# Patient Record
Sex: MALE | Race: WHITE | NOT HISPANIC OR LATINO | Employment: FULL TIME | URBAN - METROPOLITAN AREA
[De-identification: names, ages, dates, MRNs, and addresses within clinical notes are randomized per-mention and may not be internally consistent; named-entity substitution may affect disease eponyms.]

---

## 2017-02-23 ENCOUNTER — NON-PROVIDER VISIT (OUTPATIENT)
Dept: URGENT CARE | Facility: PHYSICIAN GROUP | Age: 22
End: 2017-02-23

## 2017-02-23 DIAGNOSIS — Z02.1 PRE-EMPLOYMENT DRUG SCREENING: ICD-10-CM

## 2017-02-23 LAB
AMP AMPHETAMINE: NORMAL
COC COCAINE: NORMAL
INT CON NEG: NEGATIVE
INT CON POS: POSITIVE
MET METHAMPHETAMINES: NORMAL
OPI OPIATES: NORMAL
PCP PHENCYCLIDINE: NORMAL
POC DRUG COMMENT 753798-OCCUPATIONAL HEALTH: NORMAL
THC: NORMAL

## 2017-02-23 PROCEDURE — 80305 DRUG TEST PRSMV DIR OPT OBS: CPT | Performed by: EMERGENCY MEDICINE

## 2017-05-09 ENCOUNTER — NON-PROVIDER VISIT (OUTPATIENT)
Dept: URGENT CARE | Facility: PHYSICIAN GROUP | Age: 22
End: 2017-05-09

## 2017-05-09 DIAGNOSIS — Z02.1 PRE-EMPLOYMENT DRUG SCREENING: ICD-10-CM

## 2017-05-09 PROCEDURE — 80305 DRUG TEST PRSMV DIR OPT OBS: CPT | Performed by: FAMILY MEDICINE

## 2017-10-27 ENCOUNTER — NON-PROVIDER VISIT (OUTPATIENT)
Dept: URGENT CARE | Facility: PHYSICIAN GROUP | Age: 22
End: 2017-10-27

## 2017-10-27 DIAGNOSIS — Z02.1 PRE-EMPLOYMENT DRUG SCREENING: ICD-10-CM

## 2017-10-27 PROCEDURE — 80305 DRUG TEST PRSMV DIR OPT OBS: CPT | Performed by: PHYSICIAN ASSISTANT

## 2018-10-15 ENCOUNTER — IMMUNIZATION (OUTPATIENT)
Dept: SOCIAL WORK | Facility: CLINIC | Age: 23
End: 2018-10-15

## 2018-10-15 DIAGNOSIS — Z23 NEED FOR VACCINATION: ICD-10-CM

## 2018-10-15 PROCEDURE — 90686 IIV4 VACC NO PRSV 0.5 ML IM: CPT | Performed by: REGISTERED NURSE

## 2019-10-28 ENCOUNTER — IMMUNIZATION (OUTPATIENT)
Dept: SOCIAL WORK | Facility: CLINIC | Age: 24
End: 2019-10-28

## 2019-10-28 DIAGNOSIS — Z23 NEED FOR VACCINATION: ICD-10-CM

## 2019-10-28 PROCEDURE — 90686 IIV4 VACC NO PRSV 0.5 ML IM: CPT | Performed by: REGISTERED NURSE

## 2020-08-17 ENCOUNTER — NON-PROVIDER VISIT (OUTPATIENT)
Dept: URGENT CARE | Facility: PHYSICIAN GROUP | Age: 25
End: 2020-08-17

## 2020-08-17 DIAGNOSIS — Z02.1 PRE-EMPLOYMENT DRUG SCREENING: Primary | ICD-10-CM

## 2020-08-17 LAB
AMP AMPHETAMINE: NORMAL
COC COCAINE: NORMAL
INT CON NEG: NORMAL
INT CON POS: NORMAL
MET METHAMPHETAMINES: NORMAL
OPI OPIATES: NORMAL
PCP PHENCYCLIDINE: NORMAL
POC DRUG COMMENT 753798-OCCUPATIONAL HEALTH: NEGATIVE
THC: NORMAL

## 2020-08-17 PROCEDURE — 80305 DRUG TEST PRSMV DIR OPT OBS: CPT | Performed by: NURSE PRACTITIONER

## 2021-04-14 ENCOUNTER — HOSPITAL ENCOUNTER (EMERGENCY)
Dept: HOSPITAL 8 - ED | Age: 26
Discharge: HOME | End: 2021-04-14
Payer: SELF-PAY

## 2021-04-14 VITALS — SYSTOLIC BLOOD PRESSURE: 137 MMHG | DIASTOLIC BLOOD PRESSURE: 75 MMHG

## 2021-04-14 VITALS — HEIGHT: 74 IN | WEIGHT: 300.05 LBS | BODY MASS INDEX: 38.51 KG/M2
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
## 2021-04-14 DIAGNOSIS — S93.402A: Primary | ICD-10-CM

## 2021-04-14 DIAGNOSIS — E66.9: ICD-10-CM
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
## 2021-04-14 PROCEDURE — 96372 THER/PROPH/DIAG INJ SC/IM: CPT

## 2021-04-14 PROCEDURE — 99283 EMERGENCY DEPT VISIT LOW MDM: CPT

## 2021-04-14 PROCEDURE — 73610 X-RAY EXAM OF ANKLE: CPT

## 2024-09-23 ENCOUNTER — HOSPITAL ENCOUNTER (INPATIENT)
Facility: MEDICAL CENTER | Age: 29
LOS: 3 days | End: 2024-09-27
Attending: EMERGENCY MEDICINE | Admitting: HOSPITALIST
Payer: COMMERCIAL

## 2024-09-23 DIAGNOSIS — M10.061 ACUTE IDIOPATHIC GOUT OF RIGHT KNEE: ICD-10-CM

## 2024-09-23 DIAGNOSIS — M00.9 PYOGENIC ARTHRITIS OF RIGHT KNEE JOINT, DUE TO UNSPECIFIED ORGANISM (HCC): ICD-10-CM

## 2024-09-23 LAB
ANION GAP SERPL CALC-SCNC: 11 MMOL/L (ref 7–16)
APPEARANCE FLD: NORMAL
BASOPHILS # BLD AUTO: 0.4 % (ref 0–1.8)
BASOPHILS # BLD: 0.05 K/UL (ref 0–0.12)
BODY FLD TYPE: NORMAL
BUN SERPL-MCNC: 14 MG/DL (ref 8–22)
CALCIUM SERPL-MCNC: 9.2 MG/DL (ref 8.4–10.2)
CHLORIDE SERPL-SCNC: 105 MMOL/L (ref 96–112)
CO2 SERPL-SCNC: 23 MMOL/L (ref 20–33)
COLOR FLD: YELLOW
CREAT SERPL-MCNC: 0.99 MG/DL (ref 0.5–1.4)
CRYSTALS FLD MICRO: NORMAL
EOSINOPHIL # BLD AUTO: 0.11 K/UL (ref 0–0.51)
EOSINOPHIL NFR BLD: 0.8 % (ref 0–6.9)
ERYTHROCYTE [DISTWIDTH] IN BLOOD BY AUTOMATED COUNT: 39.1 FL (ref 35.9–50)
ERYTHROCYTE [SEDIMENTATION RATE] IN BLOOD BY WESTERGREN METHOD: 20 MM/HOUR (ref 0–20)
GFR SERPLBLD CREATININE-BSD FMLA CKD-EPI: 107 ML/MIN/1.73 M 2
GLUCOSE FLD-MCNC: 90 MG/DL
GLUCOSE SERPL-MCNC: 117 MG/DL (ref 65–99)
GRAM STN SPEC: NORMAL
HCT VFR BLD AUTO: 43.4 % (ref 42–52)
HGB BLD-MCNC: 14.6 G/DL (ref 14–18)
IMM GRANULOCYTES # BLD AUTO: 0.06 K/UL (ref 0–0.11)
IMM GRANULOCYTES NFR BLD AUTO: 0.4 % (ref 0–0.9)
LYMPHOCYTES # BLD AUTO: 2.23 K/UL (ref 1–4.8)
LYMPHOCYTES NFR BLD: 15.9 % (ref 22–41)
LYMPHOCYTES NFR FLD: 7 %
MCH RBC QN AUTO: 29.6 PG (ref 27–33)
MCHC RBC AUTO-ENTMCNC: 33.6 G/DL (ref 32.3–36.5)
MCV RBC AUTO: 87.9 FL (ref 81.4–97.8)
MONOCYTES # BLD AUTO: 1.43 K/UL (ref 0–0.85)
MONOCYTES NFR BLD AUTO: 10.2 % (ref 0–13.4)
MONOS+MACROS NFR FLD MANUAL: 8 %
NEUTROPHILS # BLD AUTO: 10.17 K/UL (ref 1.82–7.42)
NEUTROPHILS NFR BLD: 72.3 % (ref 44–72)
NEUTROPHILS NFR FLD: 85 %
NRBC # BLD AUTO: 0 K/UL
NRBC BLD-RTO: 0 /100 WBC (ref 0–0.2)
NUC CELL # FLD: NORMAL CELLS/UL
PLATELET # BLD AUTO: 295 K/UL (ref 164–446)
PMV BLD AUTO: 9.6 FL (ref 9–12.9)
POTASSIUM SERPL-SCNC: 3.9 MMOL/L (ref 3.6–5.5)
PROT FLD-MCNC: 4.8 G/DL
RBC # BLD AUTO: 4.94 M/UL (ref 4.7–6.1)
RBC # FLD: <2000 CELLS/UL
SIGNIFICANT IND 70042: NORMAL
SITE SITE: NORMAL
SODIUM SERPL-SCNC: 139 MMOL/L (ref 135–145)
SOURCE SOURCE: NORMAL
WBC # BLD AUTO: 14.1 K/UL (ref 4.8–10.8)

## 2024-09-23 PROCEDURE — 20610 DRAIN/INJ JOINT/BURSA W/O US: CPT

## 2024-09-23 PROCEDURE — 99291 CRITICAL CARE FIRST HOUR: CPT

## 2024-09-23 PROCEDURE — 700101 HCHG RX REV CODE 250: Performed by: EMERGENCY MEDICINE

## 2024-09-23 PROCEDURE — 82945 GLUCOSE OTHER FLUID: CPT

## 2024-09-23 PROCEDURE — 84157 ASSAY OF PROTEIN OTHER: CPT

## 2024-09-23 PROCEDURE — 0S9C3ZX DRAINAGE OF RIGHT KNEE JOINT, PERCUTANEOUS APPROACH, DIAGNOSTIC: ICD-10-PCS | Performed by: EMERGENCY MEDICINE

## 2024-09-23 PROCEDURE — 87116 MYCOBACTERIA CULTURE: CPT

## 2024-09-23 PROCEDURE — 87205 SMEAR GRAM STAIN: CPT

## 2024-09-23 PROCEDURE — 36415 COLL VENOUS BLD VENIPUNCTURE: CPT

## 2024-09-23 PROCEDURE — 85025 COMPLETE CBC W/AUTO DIFF WBC: CPT

## 2024-09-23 PROCEDURE — 87206 SMEAR FLUORESCENT/ACID STAI: CPT

## 2024-09-23 PROCEDURE — 80048 BASIC METABOLIC PNL TOTAL CA: CPT

## 2024-09-23 PROCEDURE — 89051 BODY FLUID CELL COUNT: CPT

## 2024-09-23 PROCEDURE — 85652 RBC SED RATE AUTOMATED: CPT

## 2024-09-23 PROCEDURE — 87070 CULTURE OTHR SPECIMN AEROBIC: CPT

## 2024-09-23 PROCEDURE — 89060 EXAM SYNOVIAL FLUID CRYSTALS: CPT

## 2024-09-23 PROCEDURE — 87999 UNLISTED MICROBIOLOGY PX: CPT

## 2024-09-23 PROCEDURE — 87015 SPECIMEN INFECT AGNT CONCNTJ: CPT

## 2024-09-23 RX ADMIN — LIDOCAINE HYDROCHLORIDE 10 ML: 10 INJECTION, SOLUTION INFILTRATION; PERINEURAL at 21:48

## 2024-09-23 ASSESSMENT — PAIN DESCRIPTION - PAIN TYPE: TYPE: ACUTE PAIN

## 2024-09-24 ENCOUNTER — ANESTHESIA (OUTPATIENT)
Dept: SURGERY | Facility: MEDICAL CENTER | Age: 29
End: 2024-09-24
Payer: COMMERCIAL

## 2024-09-24 ENCOUNTER — ANESTHESIA EVENT (OUTPATIENT)
Dept: SURGERY | Facility: MEDICAL CENTER | Age: 29
End: 2024-09-24
Payer: COMMERCIAL

## 2024-09-24 PROBLEM — R73.09 ELEVATED GLUCOSE: Status: ACTIVE | Noted: 2024-09-24

## 2024-09-24 PROBLEM — R03.0 ELEVATED BLOOD PRESSURE READING: Status: ACTIVE | Noted: 2024-09-24

## 2024-09-24 PROBLEM — Z01.810 PREOPERATIVE CARDIOVASCULAR EXAMINATION: Status: ACTIVE | Noted: 2024-09-24

## 2024-09-24 PROBLEM — M00.9 SEPTIC JOINT (HCC): Status: ACTIVE | Noted: 2024-09-24

## 2024-09-24 LAB
FUNGUS SPEC CULT: NORMAL
FUNGUS SPEC CULT: NORMAL
FUNGUS SPEC FUNGUS STN: NORMAL
GRAM STN SPEC: NORMAL
GRAM STN SPEC: NORMAL
LACTATE SERPL-SCNC: 0.8 MMOL/L (ref 0.5–2)
SCCMEC + MECA PNL NOSE NAA+PROBE: NEGATIVE
SIGNIFICANT IND 70042: NORMAL
SITE SITE: NORMAL
SOURCE SOURCE: NORMAL

## 2024-09-24 PROCEDURE — 700101 HCHG RX REV CODE 250: Performed by: HOSPITALIST

## 2024-09-24 PROCEDURE — 87801 DETECT AGNT MULT DNA AMPLI: CPT

## 2024-09-24 PROCEDURE — 36415 COLL VENOUS BLD VENIPUNCTURE: CPT

## 2024-09-24 PROCEDURE — 87205 SMEAR GRAM STAIN: CPT | Mod: 91

## 2024-09-24 PROCEDURE — 0SBC4ZZ EXCISION OF RIGHT KNEE JOINT, PERCUTANEOUS ENDOSCOPIC APPROACH: ICD-10-PCS | Performed by: STUDENT IN AN ORGANIZED HEALTH CARE EDUCATION/TRAINING PROGRAM

## 2024-09-24 PROCEDURE — 87556 M.TUBERCULO DNA AMP PROBE: CPT

## 2024-09-24 PROCEDURE — 700111 HCHG RX REV CODE 636 W/ 250 OVERRIDE (IP): Mod: JZ | Performed by: ANESTHESIOLOGY

## 2024-09-24 PROCEDURE — 700105 HCHG RX REV CODE 258: Performed by: ANESTHESIOLOGY

## 2024-09-24 PROCEDURE — 96365 THER/PROPH/DIAG IV INF INIT: CPT

## 2024-09-24 PROCEDURE — 700101 HCHG RX REV CODE 250: Performed by: STUDENT IN AN ORGANIZED HEALTH CARE EDUCATION/TRAINING PROGRAM

## 2024-09-24 PROCEDURE — 160048 HCHG OR STATISTICAL LEVEL 1-5: Performed by: STUDENT IN AN ORGANIZED HEALTH CARE EDUCATION/TRAINING PROGRAM

## 2024-09-24 PROCEDURE — 87641 MR-STAPH DNA AMP PROBE: CPT

## 2024-09-24 PROCEDURE — 160035 HCHG PACU - 1ST 60 MINS PHASE I: Performed by: STUDENT IN AN ORGANIZED HEALTH CARE EDUCATION/TRAINING PROGRAM

## 2024-09-24 PROCEDURE — 3E1U48Z IRRIGATION OF JOINTS USING IRRIGATING SUBSTANCE, PERCUTANEOUS ENDOSCOPIC APPROACH: ICD-10-PCS | Performed by: STUDENT IN AN ORGANIZED HEALTH CARE EDUCATION/TRAINING PROGRAM

## 2024-09-24 PROCEDURE — 160002 HCHG RECOVERY MINUTES (STAT): Performed by: STUDENT IN AN ORGANIZED HEALTH CARE EDUCATION/TRAINING PROGRAM

## 2024-09-24 PROCEDURE — 160009 HCHG ANES TIME/MIN: Performed by: STUDENT IN AN ORGANIZED HEALTH CARE EDUCATION/TRAINING PROGRAM

## 2024-09-24 PROCEDURE — 87551 MYCOBACTERIA DNA AMP PROBE: CPT

## 2024-09-24 PROCEDURE — 700105 HCHG RX REV CODE 258: Performed by: HOSPITALIST

## 2024-09-24 PROCEDURE — 99223 1ST HOSP IP/OBS HIGH 75: CPT | Performed by: HOSPITALIST

## 2024-09-24 PROCEDURE — 160038 HCHG SURGERY MINUTES - EA ADDL 1 MIN LEVEL 2: Performed by: STUDENT IN AN ORGANIZED HEALTH CARE EDUCATION/TRAINING PROGRAM

## 2024-09-24 PROCEDURE — 96366 THER/PROPH/DIAG IV INF ADDON: CPT

## 2024-09-24 PROCEDURE — 87075 CULTR BACTERIA EXCEPT BLOOD: CPT

## 2024-09-24 PROCEDURE — 87102 FUNGUS ISOLATION CULTURE: CPT | Mod: 91

## 2024-09-24 PROCEDURE — 700101 HCHG RX REV CODE 250: Performed by: ANESTHESIOLOGY

## 2024-09-24 PROCEDURE — 83605 ASSAY OF LACTIC ACID: CPT

## 2024-09-24 PROCEDURE — 94760 N-INVAS EAR/PLS OXIMETRY 1: CPT

## 2024-09-24 PROCEDURE — 770001 HCHG ROOM/CARE - MED/SURG/GYN PRIV*

## 2024-09-24 PROCEDURE — 160027 HCHG SURGERY MINUTES - 1ST 30 MINS LEVEL 2: Performed by: STUDENT IN AN ORGANIZED HEALTH CARE EDUCATION/TRAINING PROGRAM

## 2024-09-24 PROCEDURE — 700105 HCHG RX REV CODE 258: Performed by: EMERGENCY MEDICINE

## 2024-09-24 PROCEDURE — 700111 HCHG RX REV CODE 636 W/ 250 OVERRIDE (IP): Performed by: HOSPITALIST

## 2024-09-24 PROCEDURE — 87070 CULTURE OTHR SPECIMN AEROBIC: CPT | Mod: 91

## 2024-09-24 PROCEDURE — 700101 HCHG RX REV CODE 250: Performed by: EMERGENCY MEDICINE

## 2024-09-24 PROCEDURE — 700111 HCHG RX REV CODE 636 W/ 250 OVERRIDE (IP): Performed by: EMERGENCY MEDICINE

## 2024-09-24 RX ORDER — DIPHENHYDRAMINE HYDROCHLORIDE 50 MG/ML
12.5 INJECTION INTRAMUSCULAR; INTRAVENOUS
Status: DISCONTINUED | OUTPATIENT
Start: 2024-09-24 | End: 2024-09-24 | Stop reason: HOSPADM

## 2024-09-24 RX ORDER — KETOROLAC TROMETHAMINE 15 MG/ML
INJECTION, SOLUTION INTRAMUSCULAR; INTRAVENOUS PRN
Status: DISCONTINUED | OUTPATIENT
Start: 2024-09-24 | End: 2024-09-24 | Stop reason: SURG

## 2024-09-24 RX ORDER — PROMETHAZINE HYDROCHLORIDE 25 MG/1
12.5-25 SUPPOSITORY RECTAL EVERY 4 HOURS PRN
Status: DISCONTINUED | OUTPATIENT
Start: 2024-09-24 | End: 2024-09-27 | Stop reason: HOSPADM

## 2024-09-24 RX ORDER — HYDROMORPHONE HYDROCHLORIDE 1 MG/ML
0.4 INJECTION, SOLUTION INTRAMUSCULAR; INTRAVENOUS; SUBCUTANEOUS
Status: DISCONTINUED | OUTPATIENT
Start: 2024-09-24 | End: 2024-09-24 | Stop reason: HOSPADM

## 2024-09-24 RX ORDER — ACETAMINOPHEN 325 MG/1
650 TABLET ORAL EVERY 4 HOURS PRN
Status: DISCONTINUED | OUTPATIENT
Start: 2024-09-24 | End: 2024-09-24 | Stop reason: HOSPADM

## 2024-09-24 RX ORDER — ONDANSETRON 2 MG/ML
INJECTION INTRAMUSCULAR; INTRAVENOUS PRN
Status: DISCONTINUED | OUTPATIENT
Start: 2024-09-24 | End: 2024-09-24 | Stop reason: SURG

## 2024-09-24 RX ORDER — ONDANSETRON 4 MG/1
4 TABLET, ORALLY DISINTEGRATING ORAL EVERY 4 HOURS PRN
Status: DISCONTINUED | OUTPATIENT
Start: 2024-09-24 | End: 2024-09-27 | Stop reason: HOSPADM

## 2024-09-24 RX ORDER — PROCHLORPERAZINE EDISYLATE 5 MG/ML
5-10 INJECTION INTRAMUSCULAR; INTRAVENOUS EVERY 4 HOURS PRN
Status: DISCONTINUED | OUTPATIENT
Start: 2024-09-24 | End: 2024-09-27 | Stop reason: HOSPADM

## 2024-09-24 RX ORDER — HYDROMORPHONE HYDROCHLORIDE 1 MG/ML
0.25 INJECTION, SOLUTION INTRAMUSCULAR; INTRAVENOUS; SUBCUTANEOUS
Status: DISCONTINUED | OUTPATIENT
Start: 2024-09-24 | End: 2024-09-25

## 2024-09-24 RX ORDER — VANCOMYCIN HYDROCHLORIDE 1 G/20ML
INJECTION, POWDER, LYOPHILIZED, FOR SOLUTION INTRAVENOUS PRN
Status: DISCONTINUED | OUTPATIENT
Start: 2024-09-24 | End: 2024-09-24 | Stop reason: SURG

## 2024-09-24 RX ORDER — AMOXICILLIN 250 MG
2 CAPSULE ORAL 2 TIMES DAILY
Status: DISCONTINUED | OUTPATIENT
Start: 2024-09-24 | End: 2024-09-27 | Stop reason: HOSPADM

## 2024-09-24 RX ORDER — MIDAZOLAM HYDROCHLORIDE 1 MG/ML
INJECTION INTRAMUSCULAR; INTRAVENOUS PRN
Status: DISCONTINUED | OUTPATIENT
Start: 2024-09-24 | End: 2024-09-24 | Stop reason: SURG

## 2024-09-24 RX ORDER — HYDROMORPHONE HYDROCHLORIDE 1 MG/ML
0.5 INJECTION, SOLUTION INTRAMUSCULAR; INTRAVENOUS; SUBCUTANEOUS
Status: DISCONTINUED | OUTPATIENT
Start: 2024-09-24 | End: 2024-09-25

## 2024-09-24 RX ORDER — HYDROMORPHONE HYDROCHLORIDE 1 MG/ML
0.2 INJECTION, SOLUTION INTRAMUSCULAR; INTRAVENOUS; SUBCUTANEOUS
Status: DISCONTINUED | OUTPATIENT
Start: 2024-09-24 | End: 2024-09-24 | Stop reason: HOSPADM

## 2024-09-24 RX ORDER — HYDROMORPHONE HYDROCHLORIDE 1 MG/ML
0.1 INJECTION, SOLUTION INTRAMUSCULAR; INTRAVENOUS; SUBCUTANEOUS
Status: DISCONTINUED | OUTPATIENT
Start: 2024-09-24 | End: 2024-09-24 | Stop reason: HOSPADM

## 2024-09-24 RX ORDER — HALOPERIDOL 5 MG/ML
1 INJECTION INTRAMUSCULAR
Status: DISCONTINUED | OUTPATIENT
Start: 2024-09-24 | End: 2024-09-24 | Stop reason: HOSPADM

## 2024-09-24 RX ORDER — SODIUM CHLORIDE, SODIUM LACTATE, POTASSIUM CHLORIDE, AND CALCIUM CHLORIDE .6; .31; .03; .02 G/100ML; G/100ML; G/100ML; G/100ML
500 INJECTION, SOLUTION INTRAVENOUS
Status: DISCONTINUED | OUTPATIENT
Start: 2024-09-24 | End: 2024-09-27 | Stop reason: HOSPADM

## 2024-09-24 RX ORDER — MEPERIDINE HYDROCHLORIDE 25 MG/ML
12.5 INJECTION INTRAMUSCULAR; INTRAVENOUS; SUBCUTANEOUS
Status: DISCONTINUED | OUTPATIENT
Start: 2024-09-24 | End: 2024-09-24 | Stop reason: HOSPADM

## 2024-09-24 RX ORDER — ACETAMINOPHEN 325 MG/1
650 TABLET ORAL EVERY 6 HOURS PRN
Status: DISCONTINUED | OUTPATIENT
Start: 2024-09-24 | End: 2024-09-27 | Stop reason: HOSPADM

## 2024-09-24 RX ORDER — ONDANSETRON 2 MG/ML
4 INJECTION INTRAMUSCULAR; INTRAVENOUS EVERY 4 HOURS PRN
Status: DISCONTINUED | OUTPATIENT
Start: 2024-09-24 | End: 2024-09-27 | Stop reason: HOSPADM

## 2024-09-24 RX ORDER — BUPIVACAINE HYDROCHLORIDE AND EPINEPHRINE 2.5; 5 MG/ML; UG/ML
INJECTION, SOLUTION EPIDURAL; INFILTRATION; INTRACAUDAL; PERINEURAL
Status: DISCONTINUED | OUTPATIENT
Start: 2024-09-24 | End: 2024-09-24 | Stop reason: HOSPADM

## 2024-09-24 RX ORDER — OXYCODONE HCL 5 MG/5 ML
5 SOLUTION, ORAL ORAL
Status: DISCONTINUED | OUTPATIENT
Start: 2024-09-24 | End: 2024-09-24 | Stop reason: HOSPADM

## 2024-09-24 RX ORDER — PROMETHAZINE HYDROCHLORIDE 25 MG/1
12.5-25 TABLET ORAL EVERY 4 HOURS PRN
Status: DISCONTINUED | OUTPATIENT
Start: 2024-09-24 | End: 2024-09-27 | Stop reason: HOSPADM

## 2024-09-24 RX ORDER — ONDANSETRON 2 MG/ML
4 INJECTION INTRAMUSCULAR; INTRAVENOUS
Status: DISCONTINUED | OUTPATIENT
Start: 2024-09-24 | End: 2024-09-24 | Stop reason: HOSPADM

## 2024-09-24 RX ORDER — DIPHENHYDRAMINE HYDROCHLORIDE 50 MG/ML
INJECTION INTRAMUSCULAR; INTRAVENOUS PRN
Status: DISCONTINUED | OUTPATIENT
Start: 2024-09-24 | End: 2024-09-24 | Stop reason: SURG

## 2024-09-24 RX ORDER — MIDAZOLAM HYDROCHLORIDE 1 MG/ML
1 INJECTION INTRAMUSCULAR; INTRAVENOUS
Status: DISCONTINUED | OUTPATIENT
Start: 2024-09-24 | End: 2024-09-24 | Stop reason: HOSPADM

## 2024-09-24 RX ORDER — POLYETHYLENE GLYCOL 3350 17 G/17G
1 POWDER, FOR SOLUTION ORAL
Status: DISCONTINUED | OUTPATIENT
Start: 2024-09-24 | End: 2024-09-27 | Stop reason: HOSPADM

## 2024-09-24 RX ORDER — MAGNESIUM HYDROXIDE 1200 MG/15ML
LIQUID ORAL
Status: COMPLETED | OUTPATIENT
Start: 2024-09-24 | End: 2024-09-24

## 2024-09-24 RX ORDER — EPHEDRINE SULFATE 50 MG/ML
5 INJECTION, SOLUTION INTRAVENOUS
Status: DISCONTINUED | OUTPATIENT
Start: 2024-09-24 | End: 2024-09-24 | Stop reason: HOSPADM

## 2024-09-24 RX ORDER — METOPROLOL TARTRATE 1 MG/ML
1 INJECTION, SOLUTION INTRAVENOUS
Status: DISCONTINUED | OUTPATIENT
Start: 2024-09-24 | End: 2024-09-24 | Stop reason: HOSPADM

## 2024-09-24 RX ORDER — LABETALOL HYDROCHLORIDE 5 MG/ML
5 INJECTION, SOLUTION INTRAVENOUS
Status: DISCONTINUED | OUTPATIENT
Start: 2024-09-24 | End: 2024-09-24 | Stop reason: HOSPADM

## 2024-09-24 RX ORDER — TRANEXAMIC ACID 100 MG/ML
INJECTION, SOLUTION INTRAVENOUS PRN
Status: DISCONTINUED | OUTPATIENT
Start: 2024-09-24 | End: 2024-09-24 | Stop reason: SURG

## 2024-09-24 RX ORDER — ALBUTEROL SULFATE 5 MG/ML
2.5 SOLUTION RESPIRATORY (INHALATION)
Status: DISCONTINUED | OUTPATIENT
Start: 2024-09-24 | End: 2024-09-24 | Stop reason: HOSPADM

## 2024-09-24 RX ORDER — OXYCODONE HCL 5 MG/5 ML
10 SOLUTION, ORAL ORAL
Status: DISCONTINUED | OUTPATIENT
Start: 2024-09-24 | End: 2024-09-24 | Stop reason: HOSPADM

## 2024-09-24 RX ORDER — LIDOCAINE HYDROCHLORIDE 20 MG/ML
INJECTION, SOLUTION EPIDURAL; INFILTRATION; INTRACAUDAL; PERINEURAL PRN
Status: DISCONTINUED | OUTPATIENT
Start: 2024-09-24 | End: 2024-09-24 | Stop reason: SURG

## 2024-09-24 RX ORDER — SODIUM CHLORIDE, SODIUM LACTATE, POTASSIUM CHLORIDE, CALCIUM CHLORIDE 600; 310; 30; 20 MG/100ML; MG/100ML; MG/100ML; MG/100ML
INJECTION, SOLUTION INTRAVENOUS
Status: DISCONTINUED | OUTPATIENT
Start: 2024-09-24 | End: 2024-09-24 | Stop reason: SURG

## 2024-09-24 RX ADMIN — VANCOMYCIN HYDROCHLORIDE 3000 MG: 5 INJECTION, POWDER, LYOPHILIZED, FOR SOLUTION INTRAVENOUS at 02:02

## 2024-09-24 RX ADMIN — PROPOFOL 200 MG: 10 INJECTION, EMULSION INTRAVENOUS at 07:13

## 2024-09-24 RX ADMIN — DIPHENHYDRAMINE HYDROCHLORIDE 10 MG: 50 INJECTION, SOLUTION INTRAMUSCULAR; INTRAVENOUS at 07:38

## 2024-09-24 RX ADMIN — VANCOMYCIN HYDROCHLORIDE 1500 MG: 5 INJECTION, POWDER, LYOPHILIZED, FOR SOLUTION INTRAVENOUS at 12:14

## 2024-09-24 RX ADMIN — KETOROLAC TROMETHAMINE 15 MG: 15 INJECTION, SOLUTION INTRAMUSCULAR; INTRAVENOUS at 07:38

## 2024-09-24 RX ADMIN — MIDAZOLAM HYDROCHLORIDE 2 MG: 2 INJECTION, SOLUTION INTRAMUSCULAR; INTRAVENOUS at 07:11

## 2024-09-24 RX ADMIN — VANCOMYCIN HYDROCHLORIDE 2 G: 1 INJECTION, POWDER, LYOPHILIZED, FOR SOLUTION INTRAVENOUS at 07:30

## 2024-09-24 RX ADMIN — SODIUM CHLORIDE, POTASSIUM CHLORIDE, SODIUM LACTATE AND CALCIUM CHLORIDE: 600; 310; 30; 20 INJECTION, SOLUTION INTRAVENOUS at 07:10

## 2024-09-24 RX ADMIN — TRANEXAMIC ACID 1000 MG: 100 INJECTION, SOLUTION INTRAVENOUS at 07:34

## 2024-09-24 RX ADMIN — FENTANYL CITRATE 100 MCG: 50 INJECTION, SOLUTION INTRAMUSCULAR; INTRAVENOUS at 07:17

## 2024-09-24 RX ADMIN — ONDANSETRON 4 MG: 2 INJECTION INTRAMUSCULAR; INTRAVENOUS at 07:38

## 2024-09-24 RX ADMIN — VANCOMYCIN HYDROCHLORIDE 1500 MG: 5 INJECTION, POWDER, LYOPHILIZED, FOR SOLUTION INTRAVENOUS at 17:28

## 2024-09-24 RX ADMIN — LIDOCAINE HYDROCHLORIDE 100 MG: 20 INJECTION, SOLUTION EPIDURAL; INFILTRATION; INTRACAUDAL at 07:13

## 2024-09-24 SDOH — ECONOMIC STABILITY: TRANSPORTATION INSECURITY
IN THE PAST 12 MONTHS, HAS THE LACK OF TRANSPORTATION KEPT YOU FROM MEDICAL APPOINTMENTS OR FROM GETTING MEDICATIONS?: NO

## 2024-09-24 SDOH — ECONOMIC STABILITY: TRANSPORTATION INSECURITY
IN THE PAST 12 MONTHS, HAS LACK OF RELIABLE TRANSPORTATION KEPT YOU FROM MEDICAL APPOINTMENTS, MEETINGS, WORK OR FROM GETTING THINGS NEEDED FOR DAILY LIVING?: NO

## 2024-09-24 ASSESSMENT — LIFESTYLE VARIABLES
ALCOHOL_USE: NO
EVER FELT BAD OR GUILTY ABOUT YOUR DRINKING: NO
CONSUMPTION TOTAL: NEGATIVE
EVER HAD A DRINK FIRST THING IN THE MORNING TO STEADY YOUR NERVES TO GET RID OF A HANGOVER: NO
HAVE PEOPLE ANNOYED YOU BY CRITICIZING YOUR DRINKING: NO
AVERAGE NUMBER OF DAYS PER WEEK YOU HAVE A DRINK CONTAINING ALCOHOL: 0
TOTAL SCORE: 0
ON A TYPICAL DAY WHEN YOU DRINK ALCOHOL HOW MANY DRINKS DO YOU HAVE: 0
TOTAL SCORE: 0
TOTAL SCORE: 0
HOW MANY TIMES IN THE PAST YEAR HAVE YOU HAD 5 OR MORE DRINKS IN A DAY: 0
HAVE YOU EVER FELT YOU SHOULD CUT DOWN ON YOUR DRINKING: NO

## 2024-09-24 ASSESSMENT — COGNITIVE AND FUNCTIONAL STATUS - GENERAL
SUGGESTED CMS G CODE MODIFIER DAILY ACTIVITY: CH
SUGGESTED CMS G CODE MODIFIER MOBILITY: CH
MOBILITY SCORE: 24
DAILY ACTIVITIY SCORE: 24

## 2024-09-24 ASSESSMENT — ENCOUNTER SYMPTOMS
NAUSEA: 0
SHORTNESS OF BREATH: 0
BRUISES/BLEEDS EASILY: 0
BLURRED VISION: 0
DEPRESSION: 0
MYALGIAS: 0
PALPITATIONS: 0
HEADACHES: 0
DIZZINESS: 0
CHILLS: 1
VOMITING: 0
SORE THROAT: 0
NECK PAIN: 0
COUGH: 0
DOUBLE VISION: 0
FEVER: 1
WEAKNESS: 0
INSOMNIA: 0

## 2024-09-24 ASSESSMENT — PAIN DESCRIPTION - PAIN TYPE
TYPE: SURGICAL PAIN

## 2024-09-24 ASSESSMENT — PAIN SCALES - GENERAL: PAIN_LEVEL: 0

## 2024-09-24 ASSESSMENT — SOCIAL DETERMINANTS OF HEALTH (SDOH)
WITHIN THE LAST YEAR, HAVE YOU BEEN HUMILIATED OR EMOTIONALLY ABUSED IN OTHER WAYS BY YOUR PARTNER OR EX-PARTNER?: NO
IN THE PAST 12 MONTHS, HAS THE ELECTRIC, GAS, OIL, OR WATER COMPANY THREATENED TO SHUT OFF SERVICE IN YOUR HOME?: NO
WITHIN THE LAST YEAR, HAVE YOU BEEN AFRAID OF YOUR PARTNER OR EX-PARTNER?: NO
WITHIN THE PAST 12 MONTHS, YOU WORRIED THAT YOUR FOOD WOULD RUN OUT BEFORE YOU GOT THE MONEY TO BUY MORE: NEVER TRUE
WITHIN THE PAST 12 MONTHS, THE FOOD YOU BOUGHT JUST DIDN'T LAST AND YOU DIDN'T HAVE MONEY TO GET MORE: NEVER TRUE
WITHIN THE LAST YEAR, HAVE TO BEEN RAPED OR FORCED TO HAVE ANY KIND OF SEXUAL ACTIVITY BY YOUR PARTNER OR EX-PARTNER?: NO
WITHIN THE LAST YEAR, HAVE YOU BEEN KICKED, HIT, SLAPPED, OR OTHERWISE PHYSICALLY HURT BY YOUR PARTNER OR EX-PARTNER?: NO

## 2024-09-24 ASSESSMENT — PATIENT HEALTH QUESTIONNAIRE - PHQ9
2. FEELING DOWN, DEPRESSED, IRRITABLE, OR HOPELESS: NOT AT ALL
1. LITTLE INTEREST OR PLEASURE IN DOING THINGS: NOT AT ALL
SUM OF ALL RESPONSES TO PHQ9 QUESTIONS 1 AND 2: 0

## 2024-09-24 NOTE — ED TRIAGE NOTES
Chief Complaint   Patient presents with    Knee Pain     R knee since this AM with T max 101. Denies known injury. New tattoo in area approx 10d old      Physical Exam  Pulmonary:      Effort: Pulmonary effort is normal.   Skin:     General: Skin is warm and dry.   Neurological:      Mental Status: He is alert.

## 2024-09-24 NOTE — ANESTHESIA TIME REPORT
Anesthesia Start and Stop Event Times       Date Time Event    9/24/2024 0710 Ready for Procedure     0710 Anesthesia Start     0756 Anesthesia Stop          Responsible Staff  09/24/24      Name Role Begin End    Nitin Nolasco M.D. Anesth 0710 0756          Overtime Reason:  no overtime (within assigned shift)    Comments:

## 2024-09-24 NOTE — ASSESSMENT & PLAN NOTE
-His glucose is 117 on admission.    9/25: WNL today, no need for further management at this time.

## 2024-09-24 NOTE — OR NURSING
0806 assumed pt care, report received from PETER Varma.    0814 pt sleeping, no s/s of pain or distress observed; VSS.Ice pack applied over clean, dry, intact right knee surgical dressing.     0824 pt sleeping, VSS. Pt's significant other updated w/ plan of care via telephone.     0839 pt rouses to voice, denies any pain or discomfort.     0854 pt given sips of water, tolerating well. Plan of care reviewed w/ pt. Awaiting for bed assignment.    0909 pt sleeping, no change in pt's status.     0924 pt sleeping, VSS.     0954 pt sleeping, VSS. Right knee surgical dressing remains CDI.     1015 pt sleeping, no change in pt's status.    1045 VSS, pt sleeping.    1115 pt denies any pain or discomfort, VSS.     1140 bed assignment received, report given to receiving RN.     1143 pt to room 202 via hospital bed with RN.

## 2024-09-24 NOTE — ASSESSMENT & PLAN NOTE
-Inpatient status on medical floor.  -Patient with recent tattoo done to his leg 10 days ago, coming with atraumatic worsening knee pain and erythema.  Knee was tapped in the emergency department and fluid concerning for septic arthritis.  -He also has fever, tachycardia and leukocytosis therefore positive for SIRS but there is no endorgan dysfunction and not septic on admission per sepsis 3 criteria.  -Patient was a started on vancomycin IV which we will continue.  -IV pain control with narcotics also ordered.  -He will be n.p.o. for upcoming procedure in the morning.      9/25: Patient underwent knee I/D by ortho yesterday. We have consulted ID. Currently on vancomycin.    9/26: Patient most likely with gout arthritis, unclear if the patient also has superimposed bacterial infection.  However ID for now we will stop antibiotics.  The patient did have an elevated white blood cell count, will repeat CBC tomorrow.

## 2024-09-24 NOTE — ANESTHESIA PREPROCEDURE EVALUATION
Case: [id number] Date/Time: 09/24/24 0645    Procedure: IRRIGATION AND DEBRIDEMENT, WOUND, ORTHO (Right: Knee)    Anesthesia type: General    Pre-op diagnosis: Right septic knee arthritis    Location:  OR  / SURGERY [facility]    Surgeons: Julito Gomez M.D.            Relevant Problems   Other   (positive) Septic joint (HCC)       Physical Exam    Airway   Mallampati: II  TM distance: >3 FB  Neck ROM: full       Cardiovascular - normal exam  Rhythm: regular  Rate: normal  (-) murmur     Dental - normal exam           Pulmonary - normal exam  Breath sounds clear to auscultation     Abdominal    Neurological - normal exam                   Anesthesia Plan    ASA 2- EMERGENT   ASA physical status emergent criteria: acute deteriorating condition due to infection    Plan - general       Airway plan will be LMA          Induction: intravenous    Postoperative Plan: Postoperative administration of opioids is intended.    Pertinent diagnostic labs and testing reviewed    Informed Consent:    Anesthetic plan and risks discussed with patient.    Use of blood products discussed with: patient whom consented to blood products.

## 2024-09-24 NOTE — PROGRESS NOTES
Patient admitted earlier by Dr. Blake.  For further details please review her H&P.    Patient to undergo surgery today by orthopedic surgery.  Continue antibiotics.  We appreciate further conditions.  Continue to monitor closely.

## 2024-09-24 NOTE — H&P
Hospital Medicine History & Physical Note    Date of Service  9/24/2024    Primary Care Physician  Pcp Pt States None    Consultants  orthopedics    Specialist Names: ERP discussed this case with Dr. Gomez    Code Status  Full Code    Chief Complaint  Chief Complaint   Patient presents with    Knee Pain     R knee since this AM with T max 101. Denies known injury. New tattoo in area approx 10d old        History of Presenting Illness  Olga Peace is a 29 y.o. male who presented to the ER on 9/23/2024 with complains of severe right knee pain, atraumatic and fevers of 101.  He also reports having a new tattoo around the area to his thigh about 10 days ago.  Patient is tachycardic  And has elevated WBC therefore has SIRS.  Knee tap fluid showed positive for septic arthritis.  ERP discussed with orthopedic surgeon, Dr. Gomez and patient will need to be admitted for washout in the morning therefore I was called for admission.      I discussed the plan of care with patient and ERP Dr. Olivares .    Review of Systems  Review of Systems   Constitutional:  Positive for chills, fever and malaise/fatigue.   HENT:  Negative for congestion and sore throat.    Eyes:  Negative for blurred vision and double vision.   Respiratory:  Negative for cough and shortness of breath.    Cardiovascular:  Negative for chest pain and palpitations.   Gastrointestinal:  Negative for nausea and vomiting.   Genitourinary:  Negative for dysuria and urgency.   Musculoskeletal:  Positive for joint pain. Negative for myalgias and neck pain.   Skin:  Negative for itching and rash.   Neurological:  Negative for dizziness, weakness and headaches.   Endo/Heme/Allergies:  Does not bruise/bleed easily.   Psychiatric/Behavioral:  Negative for depression. The patient does not have insomnia.        Past Medical History   has a past medical history of Patient denies medical problems.    Surgical History  Recent Tattoo to the leg    Family  History  Reviewed and not pertinent  Family history reviewed with patient. There is no family history that is pertinent to the chief complaint.     Social History   reports that he has never smoked. He does not have any smokeless tobacco history on file. He reports that he does not drink alcohol and does not use drugs.    Allergies  No Known Allergies    Medications  Prior to Admission Medications   Prescriptions Last Dose Informant Patient Reported? Taking?   acetaminophen (TYLENOL) 325 MG Tab   Yes No   Sig: Take 650 mg by mouth every four hours as needed.   albuterol (VENTOLIN OR PROVENTIL) 108 (90 BASE) MCG/ACT Aero Soln inhalation aerosol   No No   Sig: Inhale 2 Puffs by mouth every 6 hours as needed for Shortness of Breath. With spacer   guaifenesin-codeine (ROBITUSSIN AC) SOLN   No No   Sig: Take 10 mL by mouth every 6 hours as needed for Cough.   ibuprofen (MOTRIN) 100 MG/5ML SUSP   No No   Sig: Take 40 mL by mouth every 8 hours as needed.   loratadine (CLARITIN) 10 MG Tab   No No   Sig: Take 1 Tab by mouth every day.      Facility-Administered Medications: None       Physical Exam  Temp:  [37.5 °C (99.5 °F)] 37.5 °C (99.5 °F)  Pulse:  [] 101  Resp:  [16-18] 18  BP: (138-163)/(61-91) 141/62  SpO2:  [91 %-94 %] 92 %  Blood Pressure: (!) 141/62   Temperature: 37.5 °C (99.5 °F)   Pulse: (!) 101   Respiration: 18   Pulse Oximetry: 92 %       Physical Exam  Constitutional:       Appearance: Normal appearance.   HENT:      Head: Normocephalic and atraumatic.      Nose: Nose normal.      Mouth/Throat:      Mouth: Mucous membranes are moist.      Pharynx: Oropharynx is clear.   Eyes:      Extraocular Movements: Extraocular movements intact.      Pupils: Pupils are equal, round, and reactive to light.   Cardiovascular:      Rate and Rhythm: Normal rate and regular rhythm.      Pulses: Normal pulses.      Heart sounds: Normal heart sounds.   Pulmonary:      Effort: Pulmonary effort is normal.      Breath sounds:  Normal breath sounds.   Abdominal:      General: Abdomen is flat. Bowel sounds are normal.      Palpations: Abdomen is soft.   Musculoskeletal:      Cervical back: Normal range of motion and neck supple.      Comments: Right knee edema and erythema. Pain with ROM   Skin:     General: Skin is warm and dry.   Neurological:      General: No focal deficit present.      Mental Status: He is alert and oriented to person, place, and time.   Psychiatric:         Mood and Affect: Mood normal.         Behavior: Behavior normal.         Laboratory:  Recent Labs     09/23/24  2208   WBC 14.1*   RBC 4.94   HEMOGLOBIN 14.6   HEMATOCRIT 43.4   MCV 87.9   MCH 29.6   MCHC 33.6   RDW 39.1   PLATELETCT 295   MPV 9.6     Recent Labs     09/23/24  2208   SODIUM 139   POTASSIUM 3.9   CHLORIDE 105   CO2 23   GLUCOSE 117*   BUN 14   CREATININE 0.99   CALCIUM 9.2     Recent Labs     09/23/24  2208   GLUCOSE 117*             no X-Ray or EKG requiring interpretation    Assessment/Plan:  Justification for Admission Status  I anticipate this patient will require at least two midnights for appropriate medical management, necessitating inpatient admission because 29-year-old male, coming with septic joint      * Septic joint (HCC)- (present on admission)  Assessment & Plan  -Inpatient status on medical floor.  -Patient with recent tattoo done to his leg 10 days ago, coming with atraumatic worsening knee pain and erythema.  Knee was tapped in the emergency department and fluid concerning for septic arthritis.  -He also has fever, tachycardia and leukocytosis therefore positive for SIRS but there is no endorgan dysfunction and not septic on admission per sepsis 3 criteria.  -Patient was a started on vancomycin IV which we will continue.  -IV pain control with narcotics also ordered.  -He will be n.p.o. for upcoming procedure in the morning.  -ERP discussed this case with Dr. Gomez, orthopedic surgeon.  Appreciate consult  recommendations.    Preoperative cardiovascular examination  Assessment & Plan  -Cardiovascular risk is mild and procedure risk is mild.  Patient is medically optimized for surgical intervention without additional workup.    Elevated blood pressure reading  Assessment & Plan  -Blood pressure is 141/62.  This is likely secondary to pain.  Focus on pain control.  He does not have history hypertension.  Closely monitor blood pressure overnight.    Elevated glucose  Assessment & Plan  -His glucose is 117.  This is likely reactive due to above.  Just monitor chemistry panel in the morning.        VTE prophylaxis: SCDs/TEDs

## 2024-09-24 NOTE — PROGRESS NOTES
Received patient from PACU transport. He is awake and alert with no complaints of pain or discomfort. He was able to give an accurate summary of his current medical stay. Reviewed plan of care for the day including IV antibiotics and pain management. Educated on the need to use the call light to ask for assistance ambulating until the staff is comfortable allowing him to ambulate at [facility]. Call light within reach, hourly rounding in place.

## 2024-09-24 NOTE — CONSULTS
"Orthopaedic Consult Note      Olga Peace is a 29 y.o. male who presents with isolated right knee pain for the past 10 days.  He did have a recent tattoo placed which may have caused his knee pain.  His knee pain was developing swelling and he subsequently developed fever.  He was admitted to the hospital after having a knee tap demonstrating septic arthritis of his right knee.  Denies any other joint pain or issues at this point in time no numbness tingling    Past Medical History:   Diagnosis Date    Patient denies medical problems        History reviewed. No pertinent surgical history.    Medications  No current facility-administered medications on file prior to encounter.     Current Outpatient Medications on File Prior to Encounter   Medication Sig Dispense Refill    asa/apap/caffeine (EXCEDRIN) 250-250-65 MG Tab Take 1 Tablet by mouth every 6 hours as needed for Headache.      albuterol (VENTOLIN OR PROVENTIL) 108 (90 BASE) MCG/ACT Aero Soln inhalation aerosol Inhale 2 Puffs by mouth every 6 hours as needed for Shortness of Breath. With spacer 8.5 g 0    loratadine (CLARITIN) 10 MG Tab Take 1 Tab by mouth every day. 30 Tab 0    acetaminophen (TYLENOL) 325 MG Tab Take 650 mg by mouth every four hours as needed.      guaifenesin-codeine (ROBITUSSIN AC) SOLN Take 10 mL by mouth every 6 hours as needed for Cough. 200 mL 0    ibuprofen (MOTRIN) 100 MG/5ML SUSP Take 40 mL by mouth every 8 hours as needed. 240 mL 0       Allergies  Patient has no known allergies.    ROS  . All other systems were reviewed and found to be negative    History reviewed. No pertinent family history.    Social History     Socioeconomic History    Marital status: Single   Tobacco Use    Smoking status: Never   Substance and Sexual Activity    Alcohol use: No    Drug use: No       Physical Exam  Vitals  /57   Pulse 86   Temp 37.2 °C (99 °F) (Temporal)   Resp 18   Ht 1.88 m (6' 2\")   Wt (!) 134 kg (294 lb 15.6 oz)   SpO2 96% "   General: Cooperative pleasant breathing room air nontoxic-appearing  Skin: Intact, no open wounds unless otherwise noted below  Extremities: Benign with the exception of below  Right lower extremity: Skin is intact effusion present right knee, range of motion 0-90 pain with deeper knee flexion compartment soft and compressible no calf pain neurovascularly intact    Radiographs:  No orders to display       Laboratory Values  Recent Labs     09/23/24  2208   WBC 14.1*   RBC 4.94   HEMOGLOBIN 14.6   HEMATOCRIT 43.4   MCV 87.9   MCH 29.6   MCHC 33.6   RDW 39.1   PLATELETCT 295   MPV 9.6     Recent Labs     09/23/24  2208   SODIUM 139   POTASSIUM 3.9   CHLORIDE 105   CO2 23   GLUCOSE 117*   BUN 14         Labs: 57,000 whites less than 2000 reds consistent with septic arthritis    Impression:  1.  Right septic knee arthritis    Plan:   Discussed nature diagnosis went over options.  He does have a septic right knee.  I discussed nonoperative and operative measures after weighing pros and cons he like to proceed with surgery specifically right knee irrigation debridement.  I did outline that this can recur requiring further washouts in the future.  Made promises guarantees reviewed risk benefits and informed consent was obtained  -N.p.o.  -OR today for right knee irrigation debridement arthroscopically

## 2024-09-24 NOTE — ASSESSMENT & PLAN NOTE
-Blood pressure is 141/62.  This is likely secondary to pain.  Focus on pain control.  He does not have history hypertension.  Closely monitor blood pressure overnight.    9/25: BP better controlled today.    9/26: Patient seems to have spikes of elevated blood pressure readings, however does not seem to be constant.  Will continue to hold off antihypertensive medication.  Continue as needed labetalol.

## 2024-09-24 NOTE — PROGRESS NOTES
4 Eyes Skin Assessment Completed by PETER Bundy and PETER Dotson.    Head WDL  Ears WDL  Nose WDL  Mouth WDL  Neck WDL  Breast/Chest WDL  Shoulder Blades WDL  Spine WDL  (R) Arm/Elbow/Hand WDL  (L) Arm/Elbow/Hand WDL  Abdomen WDL  Groin WDL  Scrotum/Coccyx/Buttocks WDL  (R) Leg Incision with dressing, clean, dry, and intact. S/P I&D left knee  (L) Leg WDL  (R) Heel/Foot/Toe WDL  (L) Heel/Foot/Toe WDL          Devices In Places Blood Pressure Cuff, Pulse Ox, and SCD's      Interventions In Place Pillows    Possible Skin Injury No    Pictures Uploaded Into Epic N/A  Wound Consult Placed N/A  RN Wound Prevention Protocol Ordered No

## 2024-09-24 NOTE — ANESTHESIA POSTPROCEDURE EVALUATION
Patient: Olga Peace    Procedure Summary       Date: 09/24/24 Room / Location:  OR  / SURGERY [facility]    Anesthesia Start: 0710 Anesthesia Stop: 0756    Procedure: RIGHT KNEE ARTHROSCOPIC IRRIGATION AND DEBRIDEMENT, WITH LIMITED SYNEDECTOMY (Right: Knee) Diagnosis: (Right septic knee arthritis)    Surgeons: Julito Gomez M.D. Responsible Provider: Nitin Nolasco M.D.    Anesthesia Type: general ASA Status: 2 - Emergent            Final Anesthesia Type: general  Last vitals  BP   Blood Pressure: 121/57    Temp   37.2 °C (99 °F)    Pulse   86   Resp   18    SpO2   96 %      Anesthesia Post Evaluation    Patient location during evaluation: PACU  Patient participation: complete - patient participated  Level of consciousness: awake and alert  Pain score: 0    Airway patency: patent  Anesthetic complications: no  Cardiovascular status: hemodynamically stable  Respiratory status: acceptable  Hydration status: euvolemic    PONV: none          No notable events documented.     Nurse Pain Score: 1 (NPRS)

## 2024-09-24 NOTE — PROGRESS NOTES
Pharmacy Vancomycin Kinetics Note for 9/24/2024     29 y.o. male on Vancomycin day # 1     Vancomycin Indication (AUC Dosing): Osteomyelitis    Provider specified end date: 10/04/24    Active Antibiotics (From admission, onward)      Ordered     Ordering Provider       Tue Sep 24, 2024 12:39 AM    09/24/24 0039  MD Alert...Vancomycin per Pharmacy  PHARMACY TO DOSE        Question:  Indication(s) for vancomycin?  Answer:  Other (comments)  Comment:  Septic joint    Claudia Blake M.D.       Tue Sep 24, 2024 12:17 AM    09/24/24 0017  vancomycin 3000 mg/500mL NS IVPB premix  (vancomycin (VANCOCIN) IV (LD + Maintenance))  ONCE         Nitin Marquez M.D.            Dosing Weight: 134 kg (295 lb 6.7 oz)      Admission History: Admitted on 9/23/2024 for Septic joint (HCC) [M00.9]  Pertinent history: Knee pain and redness surrounding area in which he recieved a tattoo. Concern for septic joint and MRSA source. Empiric vancomycin.    Allergies:     Patient has no known allergies.     Pertinent cultures to date:     Results       Procedure Component Value Units Date/Time    FLUID CULTURE W/GRAM STAIN [[id number]] Collected: 09/23/24 2200    Order Status: Sent Specimen: Other Body Fluid Updated: 09/24/24 0300    MRSA By PCR (Amp) [[id number]] Collected: 09/24/24 0200    Order Status: Sent Specimen: Respirate from Nares Updated: 09/24/24 0251    GRAM STAIN [[id number]] Collected: 09/23/24 2200    Order Status: Completed Specimen: Body Fluid Updated: 09/23/24 2357     Significant Indicator .     Source BF     Site OTHER BODY FLUID     Gram Stain Result Testing performed at:  [facility]  [address] [place] [address]  No organisms seen.  Many WBCs.              Labs:     Estimated Creatinine Clearance: 163.3 mL/min (by C-G formula based on SCr of 0.99 mg/dL).  Recent Labs     09/23/24 2208   WBC 14.1*   NEUTSPOLYS 72.30*     Recent Labs     09/23/24 2208   BUN 14   CREATININE 0.99     No  "intake or output data in the 24 hours ending 24 0336   BP (!) 141/62   Pulse 90   Temp 37.5 °C (99.5 °F) (Temporal)   Resp 18   Ht 1.88 m (6' 2\")   Wt (!) 134 kg (294 lb 15.6 oz)   SpO2 95%  Temp (24hrs), Av.5 °C (99.5 °F), Min:37.5 °C (99.5 °F), Max:37.5 °C (99.5 °F)      List concerns for Vancomycin clearance:     None    Pharmacokinetics:     AUC kinetics:   Ke (hr ^-1): 0.1399 hr^-1  Half life: 4.95 hr  Clearance: 8.226  Estimated TDD: 4113  Estimated Dose: 1200  Estimated interval: 7    Trough kinetics:   No results for input(s): \"VANCOTROUGH\", \"VANCOPEAK\", \"VANCORANDOM\" in the last 72 hours.    A/P:     -  Vancomycin dose: 1500 mg q8h    -  Next vancomycin level(s):    -TBD    -  Predicted vancomycin AUC from initial AUC test calculator: 547 mg·hr/L    -  Comments: Knee pain and redness surrounding area in which he recieved a tattoo. Concern for septic joint and MRSA source. Empiric vancomycin. Nares sent. There is concern for accumulation given body habitus, q8h frequency is aggressive for current concerns of septic joint. If imaging and work-up is negative, consider decreasing frequency.     Shonda Mahan, PharmD    "

## 2024-09-24 NOTE — OP REPORT
OPERATIVE NOTE     DATE OF PROCEDURE: 9/24/2024           PRE-OP DIAGNOSIS:  1.  Right knee septic arthritis           POST-OP DIAGNOSIS: same           PROCEDURE:  1.  Right knee arthroscopic irrigation debridement           SURGEON: Julito Gomez M.D. - Primary           ASSISTANT: None    ANESTHESIA: General           ESTIMATED BLOOD LOSS: Minimal                  SPECIMENS: Cultures sent           COMPLICATIONS: None            CONDITION: Stable           OPERATIVE INDICATIONS AND DESCRIPTION OF PROCEDURE:     I met the patient in the preoperative holding area.  I had a full discussion with them regarding multiple options for the patient's condition including nonoperative management.  Again today I offered them nonoperative treatment modalities.  Regarding operative options I specifically I outlined right knee irrigation debridement arthroscopically for septic knee arthritis. I discussed some possible complications including bleeding possibly requiring transfusion, infection, neurovascular damage, failure of surgery, chronic pain, need for revision surgery, further irrigation debridement, instability, limb length discrepancy, prolonged rehab, weight bearing restrictions, DVT, PE, MI, stroke and death. After going over risks and benefits making no promises either guaranteed or implied patient elected to proceed.  At this point informed consent was signed.  A surgical marking pen was used to place my initials on the patient's right knee.    Patient was brought back to the operating room.  They were placed supine on a regular table all bony prominences padded very well to prevent neuropraxia's.  They were secured to the table with a strap.  General anesthesia was introduced.  Lateral post were used for for positioning.  Right lower extremity was prepped in sterile standard fashion.  At this point a timeout was performed with all parties in the room ceasing activity. Informed consent sheet was visible confirming  the patient's name date of birth MRN and matched their wristband. Antibiotics were held and then administered right after cultures were obtained were confirmed and the right lower extremity was confirmed as the correct surgical site.  My surgical initials were visible on this extremity.  At this point we were cleared to proceed with the procedure.    We began by introducing 5 cc of quarter percent bupivacaine with epinephrine into the anterior lateral anteromedial portals.     We then began by establishing anterior lateral portal.  Immediately upon inserting the atraumatic trocar purulent fluid poured out of the knee.  Cultures were obtained and sent to the lab.  At this point vancomycin was administered via anesthesia.  We then established an anterior medial portal under needle localization.  We then performed a diagnostic arthroscopy.  Those results were as follows    Arthroscopic findings:  Patellofemoral joint: Chondral surface intact synovitis in the suprapatellar pouch  Medial compartment: Meniscus chondral surface intact synovitis  Notch: Synovitis ACL PCL intact  Lateral compartment: Synovitis lateral meniscus chondral surface intact  Gutters: Synovitis popliteus intact    I then turned my attention to her scalp arthroscopic irrigation debridement.  Working through all of the various compartments of the knee irrigation debridement performed with 4 L normal saline fluid.  While working in all compartments a limited synovectomy was performed.  Using motor shaver any focal adhesions and inflamed tissue was debrided within the synovium.  Fondness debridement along with irrigation debridement the knee did appear healthy viable there was no further purulent fluid.    POSTOPERATIVE PLAN:  Weight-bearing: As tolerated  DVT prophylaxis: Aspirin 81 twice daily  Antibiotics: Ongoing defer to hospitalist  Showering: Remove dressings okay to shower postop day 4  Follow-up: As needed  Physical Therapy: Not  indicated  Prognosis: Notify me if effusion swelling pain worsens or recurrent constitutional symptoms including fever chills recurs  Follow-up: Suture removal 14 days      This is dictated with voice recognition software please excuse any errors

## 2024-09-24 NOTE — ED PROVIDER NOTES
"ED Provider Note    CHIEF COMPLAINT  Chief Complaint   Patient presents with    Knee Pain     R knee since this AM with T max 101. Denies known injury. New tattoo in area approx 10d old        EXTERNAL RECORDS REVIEWED  External ED Note [facility] ER note 4/14/2021 for ankle sprain    HPI/ROS  LIMITATION TO HISTORY   Select: : None  OUTSIDE HISTORIAN(S):  Significant other at bedside    Olga Peace is a 29 y.o. male who presents to the emergency department complaining of acute right knee pain and swelling.  States that he got a tattoo on the right thigh roughly 10 days ago.  Now having increasing pain swelling and decreased range of motion of the right knee.  Had at home temperature of 101 which ultimately prompted evaluation here to the ER.  Denies suspicion for STI.  No other joints involved.    PAST MEDICAL HISTORY   has a past medical history of Patient denies medical problems.    SURGICAL HISTORY  patient denies any surgical history    FAMILY HISTORY  History reviewed. No pertinent family history.    SOCIAL HISTORY  Social History     Tobacco Use    Smoking status: Never    Smokeless tobacco: Not on file   Substance and Sexual Activity    Alcohol use: No    Drug use: No    Sexual activity: Not on file       CURRENT MEDICATIONS  Home Medications       Reviewed by Rizwana Goel R.N. (Registered Nurse) on 09/23/24 at 2113  Med List Status: Not Addressed     Medication Last Dose Status   acetaminophen (TYLENOL) 325 MG Tab  Active   albuterol (VENTOLIN OR PROVENTIL) 108 (90 BASE) MCG/ACT Aero Soln inhalation aerosol  Active   guaifenesin-codeine (ROBITUSSIN AC) SOLN  Active   ibuprofen (MOTRIN) 100 MG/5ML SUSP  Active   loratadine (CLARITIN) 10 MG Tab  Active                    ALLERGIES  No Known Allergies    PHYSICAL EXAM  VITAL SIGNS: BP (!) 141/62   Pulse (!) 101   Temp 37.5 °C (99.5 °F) (Temporal)   Resp 18   Ht 1.88 m (6' 2\")   Wt (!) 134 kg (294 lb 15.6 oz)   SpO2 92%   BMI 37.87 kg/m²  "     Pulse ox interpretation: I interpret this pulse ox as normal.  Constitutional: Alert in no apparent distress.  HENT: No signs of trauma, Bilateral external ears normal, Nose normal.   Thorax & Lungs: Normal breath sounds, No respiratory distress  Abdomen: Bowel sounds normal, Soft, No tenderness  Skin: Warm, Dry, No erythema, No rash.     Extremities: Intact distal pulses, large effusion, decreased rom of knee, tender to palpation. New tattoo above patella but now superficial cellulitic changes.       Musculoskeletal: Good range of motion in all major joints. No tenderness to palpation or major deformities noted.   Neurologic: Alert , Normal motor function, Normal sensory function, No focal deficits noted.   Psychiatric: Affect normal, Judgment normal, Mood normal.         EKG/LABS  Results for orders placed or performed during the hospital encounter of 09/23/24   CBC WITH DIFFERENTIAL   Result Value Ref Range    WBC 14.1 (H) 4.8 - 10.8 K/uL    RBC 4.94 4.70 - 6.10 M/uL    Hemoglobin 14.6 14.0 - 18.0 g/dL    Hematocrit 43.4 42.0 - 52.0 %    MCV 87.9 81.4 - 97.8 fL    MCH 29.6 27.0 - 33.0 pg    MCHC 33.6 32.3 - 36.5 g/dL    RDW 39.1 35.9 - 50.0 fL    Platelet Count 295 164 - 446 K/uL    MPV 9.6 9.0 - 12.9 fL    Neutrophils-Polys 72.30 (H) 44.00 - 72.00 %    Lymphocytes 15.90 (L) 22.00 - 41.00 %    Monocytes 10.20 0.00 - 13.40 %    Eosinophils 0.80 0.00 - 6.90 %    Basophils 0.40 0.00 - 1.80 %    Immature Granulocytes 0.40 0.00 - 0.90 %    Nucleated RBC 0.00 0.00 - 0.20 /100 WBC    Neutrophils (Absolute) 10.17 (H) 1.82 - 7.42 K/uL    Lymphs (Absolute) 2.23 1.00 - 4.80 K/uL    Monos (Absolute) 1.43 (H) 0.00 - 0.85 K/uL    Eos (Absolute) 0.11 0.00 - 0.51 K/uL    Baso (Absolute) 0.05 0.00 - 0.12 K/uL    Immature Granulocytes (abs) 0.06 0.00 - 0.11 K/uL    NRBC (Absolute) 0.00 K/uL   BASIC METABOLIC PANEL   Result Value Ref Range    Sodium 139 135 - 145 mmol/L    Potassium 3.9 3.6 - 5.5 mmol/L    Chloride 105 96 - 112  mmol/L    Co2 23 20 - 33 mmol/L    Glucose 117 (H) 65 - 99 mg/dL    Bun 14 8 - 22 mg/dL    Creatinine 0.99 0.50 - 1.40 mg/dL    Calcium 9.2 8.4 - 10.2 mg/dL    Anion Gap 11.0 7.0 - 16.0   Sed Rate   Result Value Ref Range    Sed Rate Westergren 20 0 - 20 mm/hour   FLUID CELL COUNT   Result Value Ref Range    Fluid Type Synovial     Color-Body Fluid Yellow     Character-Body Fluid Turbid     Total RBC Count <2000 cells/uL    FL Total Nucleated Cells 76042 cells/uL    Polys 85 %    Lymphs 7 %    Fl Mono Macrophages 8 %   FLUID TOTAL PROTEIN   Result Value Ref Range    Fluid Type Synovial     Total Protein Fluid 4.8 g/dL   FLUID GLUCOSE   Result Value Ref Range    Glucose, Fluid 90 mg/dL   FLUID CULTURE W/GRAM STAIN    Specimen: Other Body Fluid   Result Value Ref Range    Significant Indicator NEG     Source BF     Site OTHER BODY FLUID     Culture Result -     Gram Stain Result       Testing performed at:  [facility]  [address] [place] [address]  No organisms seen.  Many WBCs.     FLUID CRYSTALS   Result Value Ref Range    Fluid Type Synovial     Crystals, Body Fluid Few None Seen   ESTIMATED GFR   Result Value Ref Range    GFR (CKD-EPI) 107 >60 mL/min/1.73 m 2   GRAM STAIN    Specimen: Body Fluid   Result Value Ref Range    Significant Indicator .     Source BF     Site OTHER BODY FLUID     Gram Stain Result       Testing performed at:  [facility]  [address] [place] [address]  No organisms seen.  Many WBCs.               COURSE & MEDICAL DECISION MAKING    ASSESSMENT, COURSE AND PLAN  Care Narrative:     Arthrocentesis:     Ultrasound utilized for joint evaluation and arthrocentesis.  This is the lateral joint large with large effusion.  Medial joint line similar to this.  Knee was then prepped with Betadine and draped in sterile towels.  27-gauge needle utilized to infiltrate 2.5 cc of 1% lidocaine without epinephrine.  22-gauge needle then  inserted under ultrasound guidance for arthrocentesis.  10 cc of hazy straw-colored fluid was then removed.  Band-Aid placed over insertion site.  Patient Toller well.  No complications.      DISPOSITION AND DISCUSSIONS  I have discussed management of the patient with the following physicians and [facility]: Dr. Gomez and hospitalist Dr. Hernandez    Discussion of management with other [facility] or appropriate source(s): Pharmacy for medication selection and verification      29-year-old male presenting to the ER with above presentation.  Workup as above.  Concern for acute septic joint.  Please see consult as above.  Patient will be admitted to the hospitalist at this time.  Patient will be kept n.p.o. and it may be antibiotics have been started here in the ER.    FINAL DIAGNOSIS  1. Pyogenic arthritis of right knee joint, due to unspecified organism (HCC)         Electronically signed by: Nitin Marquez M.D., 9/23/2024 9:47 PM

## 2024-09-24 NOTE — ASSESSMENT & PLAN NOTE
-Cardiovascular risk is mild and procedure risk is mild.  Patient is medically optimized for surgical intervention without additional workup.

## 2024-09-24 NOTE — OR NURSING
Procedure, patient allergies, and NPO status verified. Home med rec completed and belongings secured. Patient verbalizes understanding of pain scale, expected course of stay, and plan of care. Surgical site verified with pt, IV access established, and SCD placed on LLE.     Pt declined to remove socks, is aware they may be removed in the OR. Shoes, wallet, phone, and shorts locked in locker.

## 2024-09-24 NOTE — ANESTHESIA PROCEDURE NOTES
Airway    Date/Time: 9/24/2024 7:16 AM    Performed by: Nitin Nolasco M.D.  Authorized by: Nitin Nolasco M.D.    Location:  OR  Urgency:  Elective  Indications for Airway Management:  Anesthesia      Spontaneous Ventilation: absent    Sedation Level:  Deep  Preoxygenated: Yes    Mask Difficulty Assessment:  1 - vent by mask  Final Airway Type:  Supraglottic airway  Final Supraglottic Airway:  Standard LMA    SGA Size:  5  Number of Attempts at Approach:  1

## 2024-09-25 PROBLEM — D64.9 ANEMIA: Status: ACTIVE | Noted: 2024-09-25

## 2024-09-25 PROBLEM — D72.829 LEUKOCYTOSIS: Status: ACTIVE | Noted: 2024-09-25

## 2024-09-25 LAB
A PREV+VAG DNA SNV QL NAA+NON-PROBE: NOT DETECTED
ANION GAP SERPL CALC-SCNC: 10 MMOL/L (ref 7–16)
B FRAGILIS DNA SNV QL NAA+NON-PROBE: NOT DETECTED
BLACTX-M ISLT/SPM QL: NORMAL
BLAIMP ISLT/SPM QL: NORMAL
BLAKPC ISLT/SPM QL: NORMAL
BLAOXA-48-LIKE ISLT/SPM QL: NORMAL
BLAVIM ISLT/SPM QL: NORMAL
BUN SERPL-MCNC: 12 MG/DL (ref 8–22)
C ALBICANS DNA SNV QL NAA+NON-PROBE: NOT DETECTED
C AVID+GRANUL DNA SNV QL NAA+NON-PROBE: NOT DETECTED
C PERFRINGENS SNV QL NAA+NON-PROBE: NOT DETECTED
CALCIUM SERPL-MCNC: 8.6 MG/DL (ref 8.4–10.2)
CANDIDA DNA SNV QL NAA+NON-PROBE: NOT DETECTED
CHLORIDE SERPL-SCNC: 104 MMOL/L (ref 96–112)
CITROBAC SP DNA SNV QL NAA+NON-PROBE: NOT DETECTED
CO2 SERPL-SCNC: 23 MMOL/L (ref 20–33)
CREAT SERPL-MCNC: 0.87 MG/DL (ref 0.5–1.4)
E CLOAC COMP DNA SNV QL NAA+NON-PROBE: NOT DETECTED
E COLI DNA SNV QL NAA+NON-PROBE: NOT DETECTED
E FAECALIS DNA SNV QL NAA+NON-PROBE: NOT DETECTED
E FAECIUM DNA SNV QL NAA+NON-PROBE: NOT DETECTED
ERYTHROCYTE [DISTWIDTH] IN BLOOD BY AUTOMATED COUNT: 40.7 FL (ref 35.9–50)
F MAGNA DNA SNV QL NAA+NON-PROBE: NOT DETECTED
GFR SERPLBLD CREATININE-BSD FMLA CKD-EPI: 121 ML/MIN/1.73 M 2
GLUCOSE SERPL-MCNC: 84 MG/DL (ref 65–99)
GP B STREP DNA SNV QL NAA+NON-PROBE: NOT DETECTED
HAEM INFLU DNA SNV QL NAA+NON-PROBE: NOT DETECTED
HCT VFR BLD AUTO: 42.4 % (ref 42–52)
HGB BLD-MCNC: 13.7 G/DL (ref 14–18)
K KINGAE DNA SNV QL NAA+NON-PROBE: NOT DETECTED
K. AEROGENES DNA SNV QL NAA+NON-PROBE: NOT DETECTED
K. PNEUMON GROUP DNA SNV QL NAA+NON-PRB: NOT DETECTED
M. MORGANII DNA SNV QL NAA+NON-PROBE: NOT DETECTED
MCH RBC QN AUTO: 29.3 PG (ref 27–33)
MCHC RBC AUTO-ENTMCNC: 32.3 G/DL (ref 32.3–36.5)
MCV RBC AUTO: 90.8 FL (ref 81.4–97.8)
MECA+MECC+MREJ ISLT/SPM QL: NORMAL
N GONORRHOEA DNA SNV QL NAA+NON-PROBE: NOT DETECTED
NDM (CARBAPENEMASE), PCR L739821A: NORMAL
P AERUGINOSA DNA SNV QL NAA+NON-PROBE: NOT DETECTED
P ANAEROBIUS DNA SNV QL NAA+NON-PROBE: NOT DETECTED
P MICRA DNA SNV QL NAA+NON-PROBE: NOT DETECTED
PEPTONIPHILUS SP DNA SNV QL NAA+NON-PRB: NOT DETECTED
PLATELET # BLD AUTO: 266 K/UL (ref 164–446)
PMV BLD AUTO: 9.7 FL (ref 9–12.9)
POTASSIUM SERPL-SCNC: 4.4 MMOL/L (ref 3.6–5.5)
PROTEUS SP DNA SNV QL NAA+NON-PROBE: NOT DETECTED
RBC # BLD AUTO: 4.67 M/UL (ref 4.7–6.1)
S AUREUS DNA SNV QL NAA+NON-PROBE: NOT DETECTED
S LUGDUNENSIS DNA SNV QL NAA+NON-PROBE: NOT DETECTED
S MARCESCENS DNA SNV QL NAA+NON-PROBE: NOT DETECTED
S PNEUM DNA SNV QL NAA+NON-PROBE: NOT DETECTED
S PYO DNA SNV QL NAA+NON-PROBE: NOT DETECTED
SALMONELLA DNA SNV QL NAA+NON-PROBE: NOT DETECTED
SODIUM SERPL-SCNC: 137 MMOL/L (ref 135–145)
STREPTOCOCCUS DNA SNV QL NAA+NON-PROBE: NOT DETECTED
URATE SERPL-MCNC: 9.1 MG/DL (ref 2.5–8.3)
VANA+VANB ISLT/SPM QL: NORMAL
VANCOMYCIN PEAK 2573: 22.6 UG/ML (ref 20–40)
VANCOMYCIN TROUGH SERPL-MCNC: 14.4 UG/ML (ref 10–20)
WBC # BLD AUTO: 13 K/UL (ref 4.8–10.8)

## 2024-09-25 PROCEDURE — 90656 IIV3 VACC NO PRSV 0.5 ML IM: CPT | Performed by: INTERNAL MEDICINE

## 2024-09-25 PROCEDURE — 94760 N-INVAS EAR/PLS OXIMETRY 1: CPT

## 2024-09-25 PROCEDURE — 700102 HCHG RX REV CODE 250 W/ 637 OVERRIDE(OP): Performed by: HOSPITALIST

## 2024-09-25 PROCEDURE — 700105 HCHG RX REV CODE 258: Performed by: HOSPITALIST

## 2024-09-25 PROCEDURE — 3E02340 INTRODUCTION OF INFLUENZA VACCINE INTO MUSCLE, PERCUTANEOUS APPROACH: ICD-10-PCS | Performed by: INTERNAL MEDICINE

## 2024-09-25 PROCEDURE — 80048 BASIC METABOLIC PNL TOTAL CA: CPT

## 2024-09-25 PROCEDURE — 700102 HCHG RX REV CODE 250 W/ 637 OVERRIDE(OP): Performed by: INTERNAL MEDICINE

## 2024-09-25 PROCEDURE — 80202 ASSAY OF VANCOMYCIN: CPT | Mod: 91

## 2024-09-25 PROCEDURE — 770001 HCHG ROOM/CARE - MED/SURG/GYN PRIV*

## 2024-09-25 PROCEDURE — 700111 HCHG RX REV CODE 636 W/ 250 OVERRIDE (IP): Performed by: HOSPITALIST

## 2024-09-25 PROCEDURE — 84550 ASSAY OF BLOOD/URIC ACID: CPT

## 2024-09-25 PROCEDURE — 85027 COMPLETE CBC AUTOMATED: CPT

## 2024-09-25 PROCEDURE — 99233 SBSQ HOSP IP/OBS HIGH 50: CPT | Performed by: INTERNAL MEDICINE

## 2024-09-25 PROCEDURE — 700111 HCHG RX REV CODE 636 W/ 250 OVERRIDE (IP): Performed by: INTERNAL MEDICINE

## 2024-09-25 PROCEDURE — 90471 IMMUNIZATION ADMIN: CPT

## 2024-09-25 PROCEDURE — A9270 NON-COVERED ITEM OR SERVICE: HCPCS | Performed by: HOSPITALIST

## 2024-09-25 PROCEDURE — A9270 NON-COVERED ITEM OR SERVICE: HCPCS | Performed by: INTERNAL MEDICINE

## 2024-09-25 PROCEDURE — 99223 1ST HOSP IP/OBS HIGH 75: CPT | Performed by: INTERNAL MEDICINE

## 2024-09-25 PROCEDURE — 36415 COLL VENOUS BLD VENIPUNCTURE: CPT

## 2024-09-25 PROCEDURE — 700101 HCHG RX REV CODE 250: Performed by: HOSPITALIST

## 2024-09-25 RX ORDER — COLCHICINE 0.6 MG/1
1.2 TABLET ORAL ONCE
Status: COMPLETED | OUTPATIENT
Start: 2024-09-25 | End: 2024-09-25

## 2024-09-25 RX ORDER — OXYCODONE HYDROCHLORIDE 10 MG/1
10 TABLET ORAL EVERY 6 HOURS PRN
Status: DISCONTINUED | OUTPATIENT
Start: 2024-09-25 | End: 2024-09-27 | Stop reason: HOSPADM

## 2024-09-25 RX ORDER — HYDROMORPHONE HYDROCHLORIDE 1 MG/ML
0.5 INJECTION, SOLUTION INTRAMUSCULAR; INTRAVENOUS; SUBCUTANEOUS EVERY 6 HOURS PRN
Status: DISCONTINUED | OUTPATIENT
Start: 2024-09-25 | End: 2024-09-27 | Stop reason: HOSPADM

## 2024-09-25 RX ORDER — OXYCODONE HYDROCHLORIDE 5 MG/1
5 TABLET ORAL EVERY 6 HOURS PRN
Status: DISCONTINUED | OUTPATIENT
Start: 2024-09-25 | End: 2024-09-27 | Stop reason: HOSPADM

## 2024-09-25 RX ORDER — COLCHICINE 0.6 MG/1
0.6 TABLET ORAL DAILY
Status: DISCONTINUED | OUTPATIENT
Start: 2024-09-25 | End: 2024-09-27 | Stop reason: HOSPADM

## 2024-09-25 RX ADMIN — COLCHICINE 0.6 MG: 0.6 TABLET, FILM COATED ORAL at 18:24

## 2024-09-25 RX ADMIN — OXYCODONE HYDROCHLORIDE 5 MG: 5 TABLET ORAL at 18:28

## 2024-09-25 RX ADMIN — VANCOMYCIN HYDROCHLORIDE 1500 MG: 5 INJECTION, POWDER, LYOPHILIZED, FOR SOLUTION INTRAVENOUS at 10:25

## 2024-09-25 RX ADMIN — OXYCODONE HYDROCHLORIDE 5 MG: 5 TABLET ORAL at 13:24

## 2024-09-25 RX ADMIN — VANCOMYCIN HYDROCHLORIDE 1500 MG: 5 INJECTION, POWDER, LYOPHILIZED, FOR SOLUTION INTRAVENOUS at 02:09

## 2024-09-25 RX ADMIN — HYDROMORPHONE HYDROCHLORIDE 0.5 MG: 1 INJECTION, SOLUTION INTRAMUSCULAR; INTRAVENOUS; SUBCUTANEOUS at 02:23

## 2024-09-25 RX ADMIN — COLCHICINE 1.2 MG: 0.6 TABLET, FILM COATED ORAL at 16:27

## 2024-09-25 RX ADMIN — VANCOMYCIN HYDROCHLORIDE 1500 MG: 5 INJECTION, POWDER, LYOPHILIZED, FOR SOLUTION INTRAVENOUS at 18:23

## 2024-09-25 RX ADMIN — INFLUENZA A VIRUS A/VICTORIA/4897/2022 IVR-238 (H1N1) ANTIGEN (FORMALDEHYDE INACTIVATED), INFLUENZA A VIRUS A/CALIFORNIA/122/2022 SAN-022 (H3N2) ANTIGEN (FORMALDEHYDE INACTIVATED), AND INFLUENZA B VIRUS B/MICHIGAN/01/2021 ANTIGEN (FORMALDEHYDE INACTIVATED) 0.5 ML: 15; 15; 15 INJECTION, SUSPENSION INTRAMUSCULAR at 10:28

## 2024-09-25 RX ADMIN — SENNOSIDES AND DOCUSATE SODIUM 2 TABLET: 50; 8.6 TABLET ORAL at 18:24

## 2024-09-25 ASSESSMENT — PAIN DESCRIPTION - PAIN TYPE
TYPE: SURGICAL PAIN

## 2024-09-25 ASSESSMENT — PATIENT HEALTH QUESTIONNAIRE - PHQ9
2. FEELING DOWN, DEPRESSED, IRRITABLE, OR HOPELESS: NOT AT ALL
1. LITTLE INTEREST OR PLEASURE IN DOING THINGS: NOT AT ALL
1. LITTLE INTEREST OR PLEASURE IN DOING THINGS: NOT AT ALL
SUM OF ALL RESPONSES TO PHQ9 QUESTIONS 1 AND 2: 0
2. FEELING DOWN, DEPRESSED, IRRITABLE, OR HOPELESS: NOT AT ALL
SUM OF ALL RESPONSES TO PHQ9 QUESTIONS 1 AND 2: 0

## 2024-09-25 NOTE — PROGRESS NOTES
Pharmacy Vancomycin Kinetics Note for 9/25/2024     29 y.o. male on Vancomycin day # 2     Vancomycin Indication (AUC Dosing): Osteomyelitis  Vancomycin Indication (Two level): Osteomyelitis (goal trough 10-15)  Vancomycin Indication (Trough based Dosing):      Provider specified end date: 10/04/24    Active Antibiotics (From admission, onward)      Ordered     Ordering Provider       Tue Sep 24, 2024  3:39 AM    09/24/24 0339  vancomycin 1500 mg/250mL NS IVPB premix  (vancomycin (VANCOCIN) IV (LD + Maintenance))  EVERY 8 HOURS         Claudia Blake M.D.       Tue Sep 24, 2024 12:39 AM    09/24/24 0039  MD Alert...Vancomycin per Pharmacy  PHARMACY TO DOSE        Question:  Indication(s) for vancomycin?  Answer:  Other (comments)  Comment:  Septic joint    Claudia Blake M.D.            Dosing Weight: 133 kg (293 lb 3.4 oz)      Admission History: Admitted on 9/23/2024 for Septic joint (HCC) [M00.9]  Pertinent history: Knee pain and redness surrounding area in which he recieved a tattoo. Concern for septic joint and MRSA source. Empiric vancomycin.    Allergies:     Patient has no known allergies.     Pertinent cultures to date:     Results       Procedure Component Value Units Date/Time    Fungal Culture [[id number]] Collected: 09/24/24 0728    Order Status: Completed Specimen: Wound Updated: 09/25/24 1012     Significant Indicator NEG     Source WND     Site Right knee #2     Culture Result Culture in progress.     Fungal Smear Results No fungal elements seen.    Anaerobic Culture [[id number]] Collected: 09/24/24 0728    Order Status: Completed Specimen: Wound Updated: 09/25/24 1012     Significant Indicator NEG     Source WND     Site Right knee #2     Culture Result Culture in progress.    CULTURE WOUND W/ GRAM STAIN [[id number]] Collected: 09/24/24 0728    Order Status: Completed Specimen: Wound Updated: 09/25/24 1012     Significant Indicator NEG     Source WND     Site Right knee #2     Culture  Result No growth at 24 hours.     Gram Stain Result Many WBCs.  No organisms seen.      Fungal Culture [[id number]] Collected: 09/24/24 0727    Order Status: Completed Specimen: Wound Updated: 09/25/24 1011     Significant Indicator NEG     Source WND     Site Right kneee culture #1     Culture Result Culture in progress.     Fungal Smear Results No fungal elements seen.    Anaerobic Culture [[id number]] Collected: 09/24/24 0727    Order Status: Completed Specimen: Wound Updated: 09/25/24 1011     Significant Indicator NEG     Source WND     Site Right kneee culture #1     Culture Result Culture in progress.    CULTURE WOUND W/ GRAM STAIN [[id number]] Collected: 09/24/24 0727    Order Status: Completed Specimen: Wound Updated: 09/25/24 1011     Significant Indicator NEG     Source WND     Site Right kneee culture #1     Culture Result No growth at 24 hours.     Gram Stain Result Many WBCs.  No organisms seen.      FLUID CULTURE W/GRAM STAIN [[id number]] Collected: 09/23/24 2200    Order Status: Completed Specimen: Synovial from Other Body Fluid Updated: 09/25/24 0949     Significant Indicator NEG     Source SYNO     Site Right Knee     Culture Result No growth at 24 hours.     Gram Stain Result Testing performed at:  [facility]  [address] [place] [address]  No organisms seen.  Many WBCs.      GRAM STAIN [[id number]] Collected: 09/24/24 0728    Order Status: Completed Specimen: Wound Updated: 09/24/24 1946     Significant Indicator .     Source WND     Site Right knee #2     Gram Stain Result Many WBCs.  No organisms seen.      Fungal Smear [[id number]] Collected: 09/24/24 0728    Order Status: Completed Specimen: Wound Updated: 09/24/24 1946     Significant Indicator NEG     Source WND     Site Right knee #2     Fungal Smear Results No fungal elements seen.    GRAM STAIN [[id number]] Collected: 09/24/24 0727    Order Status: Completed Specimen: Wound Updated: 09/24/24 1946     Significant  "Indicator .     Source WND     Site Right kneee culture #1     Gram Stain Result Many WBCs.  No organisms seen.      Fungal Smear [[id number]] Collected: 24    Order Status: Completed Specimen: Wound Updated: 24     Significant Indicator NEG     Source WND     Site Right kneee culture #1     Fungal Smear Results No fungal elements seen.    MRSA By PCR (Amp) [[id number]] Collected: 24 0200    Order Status: Completed Specimen: Respirate from Nares Updated: 24 1318     MRSA by PCR Negative    AFB Culture [[id number]] Collected: 24    Order Status: Canceled Specimen: Other     AFB Culture [[id number]] Collected: 24    Order Status: Canceled Specimen: Other     GRAM STAIN [[id number]] Collected: 24    Order Status: Completed Specimen: Body Fluid Updated: 24     Significant Indicator .     Source BF     Site OTHER BODY FLUID     Gram Stain Result Testing performed at:  [facility]  [address]  [place] [address]  No organisms seen.  Many WBCs.              Labs:     Estimated Creatinine Clearance: 185.8 mL/min (by C-G formula based on SCr of 0.87 mg/dL).  Recent Labs     24  0506   WBC 14.1* 13.0*   NEUTSPOLYS 72.30*  --      Recent Labs     24  0506   BUN 14 12   CREATININE 0.99 0.87       Intake/Output Summary (Last 24 hours) at 2024 1125  Last data filed at 2024 0820  Gross per 24 hour   Intake 460 ml   Output 0 ml   Net 460 ml      /52   Pulse 93   Temp 36.4 °C (97.5 °F) (Temporal)   Resp 18   Ht 1.88 m (6' 2.02\")   Wt (!) 133 kg (293 lb 14 oz)   SpO2 90%  Temp (24hrs), Av.7 °C (98.1 °F), Min:36.3 °C (97.3 °F), Max:37.6 °C (99.6 °F)      List concerns for Vancomycin clearance:     None    Pharmacokinetics:     Two level kinetics:   Ke (hr ^-1): 0.1659  Half life: 4.2  Vd: Steady state Vd : 53.21  Calculated AUC: 506 mg·hr/L    Trough kinetics: "   Recent Labs     09/25/24  0622 09/25/24  0905   VANCOTROUGH  --  14.4   VANCOPEAK 22.6  --        A/P:     -  Vancomycin dose: 1500 mg Q8H    -  Next vancomycin level(s):    -10/2/24 Vt @ 0800     -  Predicted vancomycin AUC from two level test calculator: 506 mg·hr/L    -  Comments: Patient in steady state. No changes. Trough in 7 days.     Rizwana Kenny, ReynaldoD

## 2024-09-25 NOTE — CARE PLAN
The patient is Stable - Low risk of patient condition declining or worsening    Shift Goals  Clinical Goals: Pain management, IV antibiotics, Monitor for s/s of infection  Patient Goals: Pain control, IV antibiotics, Rest    Progress made toward(s) clinical / shift goals:  Patient receiving vancomycin. No adverse reactions. No signs or symptoms of infection. No complaints of pain or discomfort. After watching him ambulate without difficulty or dizziness he was given liberty to use the bathroom at will.     Patient is not progressing towards the following goals:

## 2024-09-25 NOTE — CARE PLAN
The patient is Stable - Low risk of patient condition declining or worsening    Shift Goals  Clinical Goals: Pt's pain will be managed, continued abx free from falls during this shift  Patient Goals: Able to rest comfortably, update POC  Family Goals: n/a    Progress made toward(s) clinical / shift goals:  Pt reported pain controlled. Tolerated diet, denies nausea. Ambulated. Continued abx per MAR. Pt did not sustain any falls during this shift.     Patient is not progressing towards the following goals:

## 2024-09-25 NOTE — CONSULTS
[facility] INFECTIOUS DISEASES INPATIENT CONSULT NOTE     Date of Service: 9/25/2024    Consult Requested By: Tejas Quiroz M.D.    Reason for Consultation: Knee pain    Chief Complaint: Knee pain    History of Present Illness:     Olga Peace is a 29 y.o. male admitted 9/23/2024.  Patient with no known significant past medical history, presented to the ER with right knee pain and fevers of up to 101.  Patient notes that about 10 days prior, he had a tattoo around the area at a legitimate tattoo placed, also had a tattoo distal anterior leg in that area has not had any issues.  He was noted to be tachycardic, Tmax 99.5, white count 14.1.  Synovial fluid revealed 56,900 white cells, 85% polys, glucose of 90, protein of 4.8, negative Gram stain, few monosodium urate crystals.  Given concern for septic arthritis, he was taken to the OR on 9/24 for arthroscopic I&D.  Cultures so far no growth till date and patient notes significant improvement, able to move his knee without much pain.  Patient notes no history of gout in the past.  His father does have gout but no recent flares.    Review of Systems:  All other systems reviewed and are negative expect as noted in HPI    Past Medical History:   Diagnosis Date    Patient denies medical problems        Past Surgical History:   Procedure Laterality Date    IRRIGATION & DEBRIDEMENT ORTHO Right 9/24/2024    Procedure: RIGHT KNEE ARTHROSCOPIC IRRIGATION AND DEBRIDEMENT, WITH LIMITED SYNOVECTOMY;  Surgeon: Julito Gomez M.D.;  Location: SURGERY [facility];  Service: Orthopedics       History reviewed. No pertinent family history.    Social History     Socioeconomic History    Marital status: Single     Spouse name: Not on file    Number of children: Not on file    Years of education: Not on file    Highest education level: Not on file   Occupational History    Not on file   Tobacco Use    Smoking status: Never    Smokeless tobacco: Not on file   Substance  and Sexual Activity    Alcohol use: No    Drug use: No    Sexual activity: Not on file   Other Topics Concern    Not on file   Social History Narrative    Not on file     Social Determinants of Health     Financial Resource Strain: Not on file   Food Insecurity: No Food Insecurity (9/24/2024)    Hunger Vital Sign     Worried About Running Out of Food in the Last Year: Never true     Ran Out of Food in the Last Year: Never true   Transportation Needs: No Transportation Needs (9/24/2024)    PRAPARE - Transportation     Lack of Transportation (Medical): No     Lack of Transportation (Non-Medical): No   Physical Activity: Not on file   Stress: Not on file   Social Connections: Not on file   Intimate Partner Violence: Not At Risk (9/24/2024)    Humiliation, Afraid, Rape, and Kick questionnaire     Fear of Current or Ex-Partner: No     Emotionally Abused: No     Physically Abused: No     Sexually Abused: No   Housing Stability: Low Risk  (9/24/2024)    Housing Stability Vital Sign     Unable to Pay for Housing in the Last Year: No     Number of Times Moved in the Last Year: 0     Homeless in the Last Year: No       No Known Allergies    Medications:    Current Facility-Administered Medications:     Pharmacy Consult Request ...Pain Management Review 1 Each, 1 Each, Other, PHARMACY TO DOSE, Tejas Quiroz M.D.    oxyCODONE immediate-release (Roxicodone) tablet 5 mg, 5 mg, Oral, Q6HRS PRN, 5 mg at 09/25/24 1324 **OR** oxyCODONE immediate release (Roxicodone) tablet 10 mg, 10 mg, Oral, Q6HRS PRN **OR** HYDROmorphone (Dilaudid) injection 0.5 mg, 0.5 mg, Intravenous, Q6HRS PRN, Tejas Quiroz M.D.    colchicine (Colcrys) tablet 1.2 mg, 1.2 mg, Oral, Once, Tejas Quiroz M.D.    colchicine (Colcrys) tablet 0.6 mg, 0.6 mg, Oral, DAILY, Tejas Quiroz M.D.    acetaminophen (Tylenol) tablet 650 mg, 650 mg, Oral, Q6HRS PRN, Claudia Gladis-Lista, M.D.    senna-docusate (Pericolace Or  "Senokot S) 8.6-50 MG per tablet 2 Tablet, 2 Tablet, Oral, BID **AND** polyethylene glycol/lytes (Miralax) Packet 1 Packet, 1 Packet, Oral, QDAY PRN, Claudia Blake M.D.    ondansetron (Zofran) syringe/vial injection 4 mg, 4 mg, Intravenous, Q4HRS PRN, Claudia Blake M.D.    ondansetron (Zofran ODT) dispertab 4 mg, 4 mg, Oral, Q4HRS PRN, Claudia Blake M.D.    promethazine (Phenergan) tablet 12.5-25 mg, 12.5-25 mg, Oral, Q4HRS PRN, Claudia Blake M.D.    promethazine (Phenergan) suppository 12.5-25 mg, 12.5-25 mg, Rectal, Q4HRS PRN, Claudia Blake M.D.    prochlorperazine (Compazine) injection 5-10 mg, 5-10 mg, Intravenous, Q4HRS PRN, Claudia Blake M.D.    LR (Bolus) infusion 500 mL, 500 mL, Intravenous, Once PRN, Claudia Blake M.D. MD Alert...Vancomycin per Pharmacy, , Other, PHARMACY TO DOSE, Claudia Blake M.D.    vancomycin 1500 mg/250mL NS IVPB premix, 1,500 mg, Intravenous, Q8HR, Claudia Blake M.D., Stopped at 24 1225    Physical Exam:   Vital Signs: /63   Pulse 93   Temp 37.1 °C (98.8 °F) (Temporal)   Resp 17   Ht 1.88 m (6' 2.02\")   Wt (!) 133 kg (293 lb 14 oz)   SpO2 91%   BMI 37.71 kg/m²   Temp  Av.8 °C (98.3 °F)  Min: 36.3 °C (97.3 °F)  Max: 37.6 °C (99.6 °F)  Vital signs reviewed  Constitutional: Patient is oriented to person, place, and time. Appears well-developed and well-nourished. No distress  Head: Atraumatic  Eyes: Conjunctivae normal  Mouth/Throat: Lips without lesions  Cardiovascular: Normal rate  Pulmonary/Chest: No respiratory distress. Unlabored respiratory effort  Abdominal: Non distended  Musculoskeletal: Right knee clean and dry dressing, no pain with passive motion  Neurological: Alert and oriented to person, place, and time. No gross cranial nerve deficit. No focal neural deficit noted  Skin: Skin is warm and dry. No rashes or embolic phenomena noted on exposed skin  Psychiatric: " "Normal mood and affect. Behavior is normal.     LABS:  Recent Labs     09/23/24 2208 09/25/24  0506   WBC 14.1* 13.0*      Recent Labs     09/23/24 2208 09/25/24  0506   HEMOGLOBIN 14.6 13.7*   HEMATOCRIT 43.4 42.4   MCV 87.9 90.8   MCH 29.6 29.3   PLATELETCT 295 266       Recent Labs     09/23/24 2208 09/25/24  0506   SODIUM 139 137   POTASSIUM 3.9 4.4   CHLORIDE 105 104   CO2 23 23   CREATININE 0.99 0.87        No results for input(s): \"ALBUMIN\" in the last 72 hours.    Invalid input(s): \"AST\", \"ALT\", \"ALKPHOS\", \"BILITOT\", \"TOTALBILIRUB\", \"BILIRUBINTOT\", \"BILIRUBINDIR\", \"BILIRUBININD\", \"ALKALINEPHOS\"     MICRO:  No results found for: \"BLOODCULTU\", \"BLDCULT\", \"BCHOLD\"     Latest pertinent labs were reviewed    IMAGING STUDIES:  No recent imaging studies to review    Hospital Course/Assessment:   Olga Peace is a 29 y.o. male patient with no significant past medical history, admitted with right knee pain more than a week following a new tattoo in the area, synovial fluid revealed 56,900 white cells, 85% polys, glucose of 90, protein of 4.8, negative Gram stain, few monosodium urate crystals, consistent with acute gout.    A superimposed septic arthritis has not been completely ruled out yet, Gram stain and cultures are negative so far, PCR was not sent.    Pertinent Diagnoses:  Acute gout, right knee  Suspected right knee superimposed septic arthritis    Plan:   -Agree with starting colchicine  -Continue IV vancomycin for now while superimposed septic arthritis is being ruled out.  Follow pending synovial fluid cultures.  Spoke with microbiology lab, they will attempt to add on a joint fluid PCR panel to the synovial fluid.  If that is negative as well, anticipate discontinuing antibiotics and monitoring closely off of antibiotics  -Given history of recent tattoo, will also add on AFB culture to the synovial fluid (if enough is available, they were unable to perform AFB culture from the operative swabs), though " this would be unlikely as would require deep inoculation from the needle with contaminated ink into the intra-articular space  -Uric acid, HIV and hepatitis C antibody in AM    Disposition: Unable to determine at this time  Need for PICC line: Unable to determine at this time    Plan was discussed with the primary team, Dr. Iris Quiroz.  ID will follow.  This illness poses a threat to limb function    Ian Locke M.D.    Please note that this dictation was created using voice recognition software. I have worked with technical experts from [facility], [facility] to optimize the interface.  I have made every reasonable attempt to correct obvious errors, but there may be errors of grammar and possibly content that I did not discover before finalizing the note.

## 2024-09-25 NOTE — PROGRESS NOTES
"Hospital Medicine Daily Progress Note    Date of Service  9/25/2024    Chief Complaint  Olga Peace is a 29 y.o. male admitted 9/23/2024 with knee pain.    Hospital Course  As per chart review:  \"29 y.o. male who presented to the ER on 9/23/2024 with complains of severe right knee pain, atraumatic and fevers of 101.  He also reports having a new tattoo around the area to his thigh about 10 days ago.  Patient is tachycardic  And has elevated WBC therefore has SIRS.  Knee tap fluid showed positive for septic arthritis.  ERP discussed with orthopedic surgeon, Dr. Gomez and patient will need to be admitted for washout.\"    Interval Problem Update  9/25: Patient seen at bedside this morning.  Overall currently not complaining of overt pain.  The patient underwent I&D by orthopedic surgery yesterday.  Following cultures.  Continue antibiotics.  We have consulted ID, we appreciate further recommendations.    I have discussed this patient's plan of care and discharge plan at IDT rounds today with Case Management, Nursing, Nursing leadership, and other members of the IDT team.    Consultants/Specialty  infectious disease and orthopedics    Code Status  Full Code    Disposition  The patient is not medically cleared for discharge to home or a post-acute facility.        Review of Systems  Review of Systems   Constitutional:  Positive for malaise/fatigue. Negative for chills and fever.   HENT:  Negative for hearing loss and nosebleeds.    Eyes:  Negative for blurred vision and double vision.   Respiratory:  Negative for cough and shortness of breath.    Cardiovascular:  Negative for chest pain and palpitations.   Gastrointestinal:  Negative for abdominal pain, heartburn and vomiting.   Genitourinary:  Negative for dysuria and urgency.   Musculoskeletal:  Positive for joint pain. Negative for back pain and falls.   Skin:  Negative for itching and rash.   Neurological:  Negative for dizziness and headaches. "   Psychiatric/Behavioral:  Negative for substance abuse. The patient is not nervous/anxious.    All other systems reviewed and are negative.    Physical Exam  Temp:  [36.4 °C (97.5 °F)-37.6 °C (99.6 °F)] 36.4 °C (97.5 °F)  Pulse:  [] 93  Resp:  [17-18] 18  BP: (102-127)/(42-78) 125/52  SpO2:  [90 %-94 %] 90 %    Physical Exam  Vitals and nursing note reviewed.   Constitutional:       Appearance: He is obese. He is ill-appearing.   HENT:      Head: Normocephalic and atraumatic.      Right Ear: External ear normal.      Left Ear: External ear normal.      Nose: Nose normal.      Mouth/Throat:      Mouth: Mucous membranes are moist.      Pharynx: Oropharynx is clear.   Eyes:      General:         Right eye: No discharge.         Left eye: No discharge.      Extraocular Movements: Extraocular movements intact.      Pupils: Pupils are equal, round, and reactive to light.   Cardiovascular:      Rate and Rhythm: Normal rate and regular rhythm.      Heart sounds: No murmur heard.  Pulmonary:      Effort: Pulmonary effort is normal. No respiratory distress.      Breath sounds: Normal breath sounds.   Abdominal:      General: Abdomen is flat. Bowel sounds are normal. There is no distension.      Palpations: Abdomen is soft.      Tenderness: There is no abdominal tenderness.   Musculoskeletal:         General: Swelling present.      Cervical back: Normal range of motion and neck supple.      Right lower leg: No edema.      Left lower leg: No edema.      Comments: Bandage in place   Skin:     General: Skin is warm.   Neurological:      General: No focal deficit present.      Mental Status: He is alert and oriented to person, place, and time.   Psychiatric:         Mood and Affect: Mood normal.         Behavior: Behavior normal.     Fluids    Intake/Output Summary (Last 24 hours) at 9/25/2024 1148  Last data filed at 9/25/2024 0820  Gross per 24 hour   Intake 460 ml   Output 0 ml   Net 460 ml        Laboratory  Recent Labs      09/23/24 2208 09/25/24  0506   WBC 14.1* 13.0*   RBC 4.94 4.67*   HEMOGLOBIN 14.6 13.7*   HEMATOCRIT 43.4 42.4   MCV 87.9 90.8   MCH 29.6 29.3   MCHC 33.6 32.3   RDW 39.1 40.7   PLATELETCT 295 266   MPV 9.6 9.7     Recent Labs     09/23/24 2208 09/25/24  0506   SODIUM 139 137   POTASSIUM 3.9 4.4   CHLORIDE 105 104   CO2 23 23   GLUCOSE 117* 84   BUN 14 12   CREATININE 0.99 0.87   CALCIUM 9.2 8.6                   Imaging  No orders to display        Assessment/Plan  * Septic joint (HCC)- (present on admission)  Assessment & Plan  -Inpatient status on medical floor.  -Patient with recent tattoo done to his leg 10 days ago, coming with atraumatic worsening knee pain and erythema.  Knee was tapped in the emergency department and fluid concerning for septic arthritis.  -He also has fever, tachycardia and leukocytosis therefore positive for SIRS but there is no endorgan dysfunction and not septic on admission per sepsis 3 criteria.  -Patient was a started on vancomycin IV which we will continue.  -IV pain control with narcotics also ordered.  -He will be n.p.o. for upcoming procedure in the morning.      9/25: Patient underwent knee I/D by ortho yesterday. We have consulted ID. Currently on vancomycin.    Leukocytosis  Assessment & Plan  In the setting of infection  Continue antibiotics  Monitor, repeat CBC    Anemia  Assessment & Plan  No signs of overt bleeding  monitor    Elevated blood pressure reading  Assessment & Plan  -Blood pressure is 141/62.  This is likely secondary to pain.  Focus on pain control.  He does not have history hypertension.  Closely monitor blood pressure overnight.    9/25: BP better controlled today.    Elevated glucose  Assessment & Plan  -His glucose is 117 on admission.    9/25: WNL today, no need for further management at this time.         VTE prophylaxis: SCDs    I have performed a physical exam and reviewed and updated ROS and Plan today (9/25/2024). In review of yesterday's note  (9/24/2024), there are no changes except as documented above.      I spend at least 51 minutes providing care for this patient with this included face-to-face interview, physical examination.  Review of lab work including CBC, BMP.  Consulting and discussing with ID.  Discussing with multidisciplinary team including case management, nursing staff and pharmacy.  Creating plan of care, reviewing orders.

## 2024-09-25 NOTE — PROGRESS NOTES
BSSR received from night RN. Pt resting comfortably in bed not in any distress on RA at 92%. AAOx4. Reported pain, resting for relief. Denies any pain. R knee dressing in place, CDI. CMS noted. Discussed POC. Fall risk precautions in place, locked bed in lowest position and call light within reach. All needs met at this time. Hourly rounding in place.

## 2024-09-25 NOTE — CARE PLAN
The patient is Stable - Low risk of patient condition declining or worsening    Shift Goals  Clinical Goals: Control pain at 4/10 or less post intervention  Patient Goals: Sleep/rest overnight    Progress made toward(s) clinical / shift goals:  Pain has been well controlled with a minimum of medication    Patient is not progressing towards the following goals:

## 2024-09-26 PROBLEM — E83.42 HYPOMAGNESEMIA: Status: ACTIVE | Noted: 2024-09-26

## 2024-09-26 PROBLEM — M10.061 ACUTE IDIOPATHIC GOUT OF RIGHT KNEE: Status: ACTIVE | Noted: 2024-09-26

## 2024-09-26 LAB
ALBUMIN SERPL BCP-MCNC: 3.2 G/DL (ref 3.2–4.9)
ALBUMIN/GLOB SERPL: 1 G/DL
ALP SERPL-CCNC: 51 U/L (ref 30–99)
ALT SERPL-CCNC: 19 U/L (ref 2–50)
ANION GAP SERPL CALC-SCNC: 12 MMOL/L (ref 7–16)
AST SERPL-CCNC: 10 U/L (ref 12–45)
BILIRUB SERPL-MCNC: 0.5 MG/DL (ref 0.1–1.5)
BUN SERPL-MCNC: 11 MG/DL (ref 8–22)
CALCIUM ALBUM COR SERPL-MCNC: 9 MG/DL (ref 8.5–10.5)
CALCIUM SERPL-MCNC: 8.4 MG/DL (ref 8.4–10.2)
CHLORIDE SERPL-SCNC: 105 MMOL/L (ref 96–112)
CO2 SERPL-SCNC: 24 MMOL/L (ref 20–33)
CREAT SERPL-MCNC: 0.9 MG/DL (ref 0.5–1.4)
ERYTHROCYTE [DISTWIDTH] IN BLOOD BY AUTOMATED COUNT: 38.9 FL (ref 35.9–50)
GFR SERPLBLD CREATININE-BSD FMLA CKD-EPI: 120 ML/MIN/1.73 M 2
GLOBULIN SER CALC-MCNC: 3.3 G/DL (ref 1.9–3.5)
GLUCOSE SERPL-MCNC: 92 MG/DL (ref 65–99)
HCT VFR BLD AUTO: 40.2 % (ref 42–52)
HCV AB SER QL: NORMAL
HGB BLD-MCNC: 13.2 G/DL (ref 14–18)
HIV 1+2 AB+HIV1 P24 AG SERPL QL IA: NORMAL
MAGNESIUM SERPL-MCNC: 1.8 MG/DL (ref 1.5–2.5)
MCH RBC QN AUTO: 29.3 PG (ref 27–33)
MCHC RBC AUTO-ENTMCNC: 32.8 G/DL (ref 32.3–36.5)
MCV RBC AUTO: 89.1 FL (ref 81.4–97.8)
MYCOBACTERIUM SPEC CULT: NORMAL
PLATELET # BLD AUTO: 285 K/UL (ref 164–446)
PMV BLD AUTO: 9.9 FL (ref 9–12.9)
POTASSIUM SERPL-SCNC: 3.9 MMOL/L (ref 3.6–5.5)
PROT SERPL-MCNC: 6.5 G/DL (ref 6–8.2)
RBC # BLD AUTO: 4.51 M/UL (ref 4.7–6.1)
RHODAMINE-AURAMINE STN SPEC: NORMAL
RHODAMINE-AURAMINE STN SPEC: NORMAL
SIGNIFICANT IND 70042: NORMAL
SIGNIFICANT IND 70042: NORMAL
SITE SITE: NORMAL
SITE SITE: NORMAL
SODIUM SERPL-SCNC: 141 MMOL/L (ref 135–145)
SOURCE SOURCE: NORMAL
SOURCE SOURCE: NORMAL
WBC # BLD AUTO: 14.2 K/UL (ref 4.8–10.8)

## 2024-09-26 PROCEDURE — 87389 HIV-1 AG W/HIV-1&-2 AB AG IA: CPT

## 2024-09-26 PROCEDURE — 99233 SBSQ HOSP IP/OBS HIGH 50: CPT | Performed by: INTERNAL MEDICINE

## 2024-09-26 PROCEDURE — 700101 HCHG RX REV CODE 250: Performed by: HOSPITALIST

## 2024-09-26 PROCEDURE — 85027 COMPLETE CBC AUTOMATED: CPT

## 2024-09-26 PROCEDURE — 700102 HCHG RX REV CODE 250 W/ 637 OVERRIDE(OP): Performed by: HOSPITALIST

## 2024-09-26 PROCEDURE — 94760 N-INVAS EAR/PLS OXIMETRY 1: CPT

## 2024-09-26 PROCEDURE — 86803 HEPATITIS C AB TEST: CPT

## 2024-09-26 PROCEDURE — 36415 COLL VENOUS BLD VENIPUNCTURE: CPT

## 2024-09-26 PROCEDURE — A9270 NON-COVERED ITEM OR SERVICE: HCPCS | Performed by: HOSPITALIST

## 2024-09-26 PROCEDURE — 700105 HCHG RX REV CODE 258: Performed by: HOSPITALIST

## 2024-09-26 PROCEDURE — 770001 HCHG ROOM/CARE - MED/SURG/GYN PRIV*

## 2024-09-26 PROCEDURE — 700111 HCHG RX REV CODE 636 W/ 250 OVERRIDE (IP): Performed by: HOSPITALIST

## 2024-09-26 PROCEDURE — A9270 NON-COVERED ITEM OR SERVICE: HCPCS | Performed by: INTERNAL MEDICINE

## 2024-09-26 PROCEDURE — 83735 ASSAY OF MAGNESIUM: CPT

## 2024-09-26 PROCEDURE — 700111 HCHG RX REV CODE 636 W/ 250 OVERRIDE (IP): Performed by: INTERNAL MEDICINE

## 2024-09-26 PROCEDURE — 700102 HCHG RX REV CODE 250 W/ 637 OVERRIDE(OP): Performed by: INTERNAL MEDICINE

## 2024-09-26 PROCEDURE — 80053 COMPREHEN METABOLIC PANEL: CPT

## 2024-09-26 RX ORDER — LABETALOL HYDROCHLORIDE 5 MG/ML
10 INJECTION, SOLUTION INTRAVENOUS EVERY 4 HOURS PRN
Status: DISCONTINUED | OUTPATIENT
Start: 2024-09-26 | End: 2024-09-27 | Stop reason: HOSPADM

## 2024-09-26 RX ORDER — MAGNESIUM SULFATE HEPTAHYDRATE 40 MG/ML
2 INJECTION, SOLUTION INTRAVENOUS ONCE
Status: COMPLETED | OUTPATIENT
Start: 2024-09-26 | End: 2024-09-26

## 2024-09-26 RX ADMIN — SENNOSIDES AND DOCUSATE SODIUM 2 TABLET: 50; 8.6 TABLET ORAL at 05:42

## 2024-09-26 RX ADMIN — VANCOMYCIN HYDROCHLORIDE 1500 MG: 5 INJECTION, POWDER, LYOPHILIZED, FOR SOLUTION INTRAVENOUS at 01:59

## 2024-09-26 RX ADMIN — COLCHICINE 0.6 MG: 0.6 TABLET, FILM COATED ORAL at 05:42

## 2024-09-26 RX ADMIN — MAGNESIUM SULFATE HEPTAHYDRATE 2 G: 2 INJECTION, SOLUTION INTRAVENOUS at 05:43

## 2024-09-26 ASSESSMENT — ENCOUNTER SYMPTOMS
NERVOUS/ANXIOUS: 0
FEVER: 0
PALPITATIONS: 0
DIZZINESS: 0
DOUBLE VISION: 0
BLURRED VISION: 0
SHORTNESS OF BREATH: 0
FALLS: 0
BACK PAIN: 0
COUGH: 0
HEADACHES: 0
ABDOMINAL PAIN: 0
VOMITING: 0
HEARTBURN: 0
CHILLS: 0

## 2024-09-26 ASSESSMENT — PATIENT HEALTH QUESTIONNAIRE - PHQ9
1. LITTLE INTEREST OR PLEASURE IN DOING THINGS: NOT AT ALL
SUM OF ALL RESPONSES TO PHQ9 QUESTIONS 1 AND 2: 0
2. FEELING DOWN, DEPRESSED, IRRITABLE, OR HOPELESS: NOT AT ALL

## 2024-09-26 ASSESSMENT — LIFESTYLE VARIABLES: SUBSTANCE_ABUSE: 0

## 2024-09-26 ASSESSMENT — PAIN DESCRIPTION - PAIN TYPE
TYPE: SURGICAL PAIN
TYPE: SURGICAL PAIN

## 2024-09-26 NOTE — CARE PLAN
The patient is Stable - Low risk of patient condition declining or worsening    Shift Goals  Clinical Goals: pain control, no falls, continue IV abx  Patient Goals: rest and pain management  Family Goals: n/a    Progress made toward(s) clinical / shift goals:  patient has had no complaints of pain requiring PRN medications this shift. Pt sustained no falls during the night. IV abx continue as per MAR.    Patient is not progressing towards the following goals:

## 2024-09-26 NOTE — PROGRESS NOTES
Brief ID note:    -Synovial fluid and OR cultures negative for more than 48 hours, joint fluid PCR panel also negative  -Synovial fluid positive for monosodium urate crystals  -AFB cultures added on, pending.  Will also send samples to [facility] for broad range PCR.  This may take several weeks to return  -Discussed with patient unable to completely rule out a superimposed septic arthritis but all the data so far argues against one and the risks of unnecessary long-term antibiotic therapy are a great many.   -In the meantime, recommend stopping antibiotics and continuing treatment for gout.  We will follow-up closely in the ID clinic off of antibiotics to ensure no worsening    ID will sign off.  Please call us back if any changes in clinical status.  ID clinic follow-up in 1 to 2 weeks with SUSY Roberts

## 2024-09-26 NOTE — PROGRESS NOTES
"Hospital Medicine Daily Progress Note    Date of Service  9/26/2024    Chief Complaint  Olga Peace is a 29 y.o. male admitted 9/23/2024 with knee pain.    Hospital Course  As per chart review:  \"29 y.o. male who presented to the ER on 9/23/2024 with complains of severe right knee pain, atraumatic and fevers of 101.  He also reports having a new tattoo around the area to his thigh about 10 days ago.  Patient is tachycardic  And has elevated WBC therefore has SIRS.  Knee tap fluid showed positive for septic arthritis.  ERP discussed with orthopedic surgeon, Dr. Gomez and patient will need to be admitted for washout.\"    Interval Problem Update  9/25: Patient seen at bedside this morning.  Overall currently not complaining of overt pain.  The patient underwent I&D by orthopedic surgery yesterday.  Following cultures.  Continue antibiotics.  We have consulted ID, we appreciate further recommendations.    9/26: Patient seen at bedside this morning.  There is concern for gouty arthritis.  Unclear if the patient could have superimposed bacterial infection, however ID recommending against antibiotics at this time.  They will follow-up with the patient as an outpatient.  The patient also with elevated white blood cell count, will continue with colchicine and repeat CBC tomorrow.  Continue to monitor closely.    I have discussed this patient's plan of care and discharge plan at IDT rounds today with Case Management, Nursing, Nursing leadership, and other members of the IDT team.    Consultants/Specialty  infectious disease and orthopedics    Code Status  Full Code    Disposition  The patient is not medically cleared for discharge to home or a post-acute facility.        Review of Systems  Review of Systems   Constitutional:  Positive for malaise/fatigue. Negative for chills and fever.   HENT:  Negative for hearing loss and nosebleeds.    Eyes:  Negative for blurred vision and double vision.   Respiratory:  Negative " for cough and shortness of breath.    Cardiovascular:  Negative for chest pain and palpitations.   Gastrointestinal:  Negative for abdominal pain, heartburn and vomiting.   Genitourinary:  Negative for dysuria and urgency.   Musculoskeletal:  Positive for joint pain. Negative for back pain and falls.   Skin:  Negative for itching and rash.   Neurological:  Negative for dizziness and headaches.   Psychiatric/Behavioral:  Negative for substance abuse. The patient is not nervous/anxious.    All other systems reviewed and are negative.       Physical Exam  Temp:  [36.1 °C (97 °F)-37.5 °C (99.5 °F)] 36.1 °C (97 °F)  Pulse:  [] 95  Resp:  [17-18] 17  BP: (119-160)/(63-88) 140/81  SpO2:  [90 %-95 %] 95 %    Physical Exam  Vitals and nursing note reviewed.   Constitutional:       Appearance: He is obese. He is ill-appearing.   HENT:      Head: Normocephalic and atraumatic.      Right Ear: External ear normal.      Left Ear: External ear normal.      Nose: Nose normal.      Mouth/Throat:      Mouth: Mucous membranes are moist.      Pharynx: Oropharynx is clear.   Eyes:      General:         Right eye: No discharge.         Left eye: No discharge.      Extraocular Movements: Extraocular movements intact.      Pupils: Pupils are equal, round, and reactive to light.   Cardiovascular:      Rate and Rhythm: Normal rate and regular rhythm.      Heart sounds: No murmur heard.  Pulmonary:      Effort: Pulmonary effort is normal. No respiratory distress.      Breath sounds: Normal breath sounds.   Abdominal:      General: Abdomen is flat. Bowel sounds are normal. There is no distension.      Palpations: Abdomen is soft.      Tenderness: There is no abdominal tenderness.   Musculoskeletal:         General: Swelling present.      Cervical back: Normal range of motion and neck supple.      Right lower leg: No edema.      Left lower leg: No edema.      Comments: Bandage in place   Skin:     General: Skin is warm.   Neurological:       General: No focal deficit present.      Mental Status: He is alert and oriented to person, place, and time.   Psychiatric:         Mood and Affect: Mood normal.         Behavior: Behavior normal.         Fluids  No intake or output data in the 24 hours ending 09/26/24 1316       Laboratory  Recent Labs     09/23/24 2208 09/25/24  0506 09/26/24  0043   WBC 14.1* 13.0* 14.2*   RBC 4.94 4.67* 4.51*   HEMOGLOBIN 14.6 13.7* 13.2*   HEMATOCRIT 43.4 42.4 40.2*   MCV 87.9 90.8 89.1   MCH 29.6 29.3 29.3   MCHC 33.6 32.3 32.8   RDW 39.1 40.7 38.9   PLATELETCT 295 266 285   MPV 9.6 9.7 9.9     Recent Labs     09/23/24 2208 09/25/24  0506 09/26/24  0043   SODIUM 139 137 141   POTASSIUM 3.9 4.4 3.9   CHLORIDE 105 104 105   CO2 23 23 24   GLUCOSE 117* 84 92   BUN 14 12 11   CREATININE 0.99 0.87 0.90   CALCIUM 9.2 8.6 8.4                   Imaging  No orders to display        Assessment/Plan  * Septic joint (HCC)- (present on admission)  Assessment & Plan  -Inpatient status on medical floor.  -Patient with recent tattoo done to his leg 10 days ago, coming with atraumatic worsening knee pain and erythema.  Knee was tapped in the emergency department and fluid concerning for septic arthritis.  -He also has fever, tachycardia and leukocytosis therefore positive for SIRS but there is no endorgan dysfunction and not septic on admission per sepsis 3 criteria.  -Patient was a started on vancomycin IV which we will continue.  -IV pain control with narcotics also ordered.  -He will be n.p.o. for upcoming procedure in the morning.      9/25: Patient underwent knee I/D by ortho yesterday. We have consulted ID. Currently on vancomycin.    9/26: Patient most likely with gout arthritis, unclear if the patient also has superimposed bacterial infection.  However ID for now we will stop antibiotics.  The patient did have an elevated white blood cell count, will repeat CBC tomorrow.    Acute idiopathic gout of right knee  Assessment &  Plan  9/26: Continue colchicine.    Hypomagnesemia  Assessment & Plan  Replace as needed  monitor    Leukocytosis  Assessment & Plan  Monitor, repeat CBC off antibiotics    Anemia  Assessment & Plan  No signs of overt bleeding  monitor    Elevated blood pressure reading  Assessment & Plan  -Blood pressure is 141/62.  This is likely secondary to pain.  Focus on pain control.  He does not have history hypertension.  Closely monitor blood pressure overnight.    9/25: BP better controlled today.    9/26: Patient seems to have spikes of elevated blood pressure readings, however does not seem to be constant.  Will continue to hold off antihypertensive medication.  Continue as needed labetalol.    Elevated glucose  Assessment & Plan  -His glucose is 117 on admission.    9/25: WNL today, no need for further management at this time.         VTE prophylaxis: SCDs    I have performed a physical exam and reviewed and updated ROS and Plan today (9/26/2024). In review of yesterday's note (9/25/2024), there are no changes except as documented above.      I spend at least 52 minutes providing care for this patient with this included face-to-face interview, physical examination.  Review of lab work including CBC, CMP, Mag and Uric Acid.  Discussing with ID plan of care.  Discussing with multidisciplinary team including case management, nursing staff and pharmacy.  Creating plan of care, reviewing orders.

## 2024-09-26 NOTE — PROGRESS NOTES
Assumed care, patient sleeping. Magnesium IVPB finishing. No observable distress or discomfort. Call light within reach, hourly rounding in place.

## 2024-09-26 NOTE — PROGRESS NOTES
Assumed care of pt at 1915, bedside report with PETER Franklin. Pt is AAOx 4, resting comfortably in bed with NADN, no current complaints of pain.    Pt using call light appropriately and to get up with assistance. Discussed plan of care for the night with patient, bed in lowest position, call light in reach, personal belongings in reach.

## 2024-09-27 ENCOUNTER — PATIENT OUTREACH (OUTPATIENT)
Dept: SCHEDULING | Facility: IMAGING CENTER | Age: 29
End: 2024-09-27
Payer: COMMERCIAL

## 2024-09-27 VITALS
HEART RATE: 76 BPM | RESPIRATION RATE: 18 BRPM | DIASTOLIC BLOOD PRESSURE: 49 MMHG | SYSTOLIC BLOOD PRESSURE: 104 MMHG | BODY MASS INDEX: 37.72 KG/M2 | OXYGEN SATURATION: 96 % | TEMPERATURE: 98.5 F | WEIGHT: 293.87 LBS | HEIGHT: 74 IN

## 2024-09-27 LAB
ANION GAP SERPL CALC-SCNC: 12 MMOL/L (ref 7–16)
BACTERIA FLD AEROBE CULT: NORMAL
BACTERIA WND AEROBE CULT: NORMAL
BACTERIA WND AEROBE CULT: NORMAL
BASOPHILS # BLD AUTO: 0.3 % (ref 0–1.8)
BASOPHILS # BLD: 0.04 K/UL (ref 0–0.12)
BUN SERPL-MCNC: 11 MG/DL (ref 8–22)
CALCIUM SERPL-MCNC: 8.6 MG/DL (ref 8.4–10.2)
CHLORIDE SERPL-SCNC: 103 MMOL/L (ref 96–112)
CO2 SERPL-SCNC: 24 MMOL/L (ref 20–33)
CREAT SERPL-MCNC: 0.85 MG/DL (ref 0.5–1.4)
EOSINOPHIL # BLD AUTO: 0.32 K/UL (ref 0–0.51)
EOSINOPHIL NFR BLD: 2.7 % (ref 0–6.9)
ERYTHROCYTE [DISTWIDTH] IN BLOOD BY AUTOMATED COUNT: 38.8 FL (ref 35.9–50)
GFR SERPLBLD CREATININE-BSD FMLA CKD-EPI: 122 ML/MIN/1.73 M 2
GLUCOSE SERPL-MCNC: 85 MG/DL (ref 65–99)
GRAM STN SPEC: NORMAL
HCT VFR BLD AUTO: 40.2 % (ref 42–52)
HGB BLD-MCNC: 13.4 G/DL (ref 14–18)
IMM GRANULOCYTES # BLD AUTO: 0.06 K/UL (ref 0–0.11)
IMM GRANULOCYTES NFR BLD AUTO: 0.5 % (ref 0–0.9)
LYMPHOCYTES # BLD AUTO: 2.45 K/UL (ref 1–4.8)
LYMPHOCYTES NFR BLD: 20.5 % (ref 22–41)
MAGNESIUM SERPL-MCNC: 2.1 MG/DL (ref 1.5–2.5)
MCH RBC QN AUTO: 29.6 PG (ref 27–33)
MCHC RBC AUTO-ENTMCNC: 33.3 G/DL (ref 32.3–36.5)
MCV RBC AUTO: 88.9 FL (ref 81.4–97.8)
MONOCYTES # BLD AUTO: 0.99 K/UL (ref 0–0.85)
MONOCYTES NFR BLD AUTO: 8.3 % (ref 0–13.4)
NEUTROPHILS # BLD AUTO: 8.12 K/UL (ref 1.82–7.42)
NEUTROPHILS NFR BLD: 67.7 % (ref 44–72)
NRBC # BLD AUTO: 0 K/UL
NRBC BLD-RTO: 0 /100 WBC (ref 0–0.2)
PLATELET # BLD AUTO: 319 K/UL (ref 164–446)
PMV BLD AUTO: 9.9 FL (ref 9–12.9)
POTASSIUM SERPL-SCNC: 3.7 MMOL/L (ref 3.6–5.5)
RBC # BLD AUTO: 4.52 M/UL (ref 4.7–6.1)
SIGNIFICANT IND 70042: NORMAL
SITE SITE: NORMAL
SODIUM SERPL-SCNC: 139 MMOL/L (ref 135–145)
SOURCE SOURCE: NORMAL
WBC # BLD AUTO: 12 K/UL (ref 4.8–10.8)

## 2024-09-27 PROCEDURE — 36415 COLL VENOUS BLD VENIPUNCTURE: CPT

## 2024-09-27 PROCEDURE — 700102 HCHG RX REV CODE 250 W/ 637 OVERRIDE(OP): Performed by: HOSPITALIST

## 2024-09-27 PROCEDURE — A9270 NON-COVERED ITEM OR SERVICE: HCPCS | Performed by: INTERNAL MEDICINE

## 2024-09-27 PROCEDURE — 99239 HOSP IP/OBS DSCHRG MGMT >30: CPT | Performed by: INTERNAL MEDICINE

## 2024-09-27 PROCEDURE — 700102 HCHG RX REV CODE 250 W/ 637 OVERRIDE(OP): Performed by: INTERNAL MEDICINE

## 2024-09-27 PROCEDURE — A9270 NON-COVERED ITEM OR SERVICE: HCPCS | Performed by: HOSPITALIST

## 2024-09-27 PROCEDURE — 80048 BASIC METABOLIC PNL TOTAL CA: CPT

## 2024-09-27 PROCEDURE — 83735 ASSAY OF MAGNESIUM: CPT

## 2024-09-27 PROCEDURE — 85025 COMPLETE CBC W/AUTO DIFF WBC: CPT

## 2024-09-27 RX ORDER — COLCHICINE 0.6 MG/1
0.6 TABLET ORAL DAILY
Qty: 7 TABLET | Refills: 0 | Status: SHIPPED | OUTPATIENT
Start: 2024-09-28 | End: 2024-10-05

## 2024-09-27 RX ORDER — ASPIRIN 81 MG/1
81 TABLET, CHEWABLE ORAL 2 TIMES DAILY
Qty: 60 TABLET | Refills: 0 | Status: SHIPPED | OUTPATIENT
Start: 2024-09-27 | End: 2024-10-03 | Stop reason: SDUPTHER

## 2024-09-27 RX ADMIN — COLCHICINE 0.6 MG: 0.6 TABLET, FILM COATED ORAL at 05:17

## 2024-09-27 RX ADMIN — SENNOSIDES AND DOCUSATE SODIUM 2 TABLET: 50; 8.6 TABLET ORAL at 05:17

## 2024-09-27 ASSESSMENT — PAIN DESCRIPTION - PAIN TYPE: TYPE: ACUTE PAIN;SURGICAL PAIN

## 2024-09-27 NOTE — DISCHARGE SUMMARY
"Discharge Summary    CHIEF COMPLAINT ON ADMISSION  Chief Complaint   Patient presents with    Knee Pain     R knee since this AM with T max 101. Denies known injury. New tattoo in area approx 10d old        Reason for Admission  Knee Pain     Admission Date  9/23/2024    CODE STATUS  Full Code    HPI & HOSPITAL COURSE  As per chart review:  \"29 y.o. male who presented to the ER on 9/23/2024 with complains of severe right knee pain, atraumatic and fevers of 101.  He also reports having a new tattoo around the area to his thigh about 10 days ago.  Patient is tachycardic  And has elevated WBC therefore has SIRS.  Knee tap fluid showed positive for septic arthritis.  ERP discussed with orthopedic surgeon, Dr. Gomez and patient will need to be admitted for washout.\"    Patient was admitted for further management and care.  Patient underwent I&D with orthopedic surgery.  ID was consulted.  The patient remained on antibiotics while hospitalized, however eventually ID recommended discontinuing antibiotics.  They are not positive this is infection.  They are concerned this is most likely an acute gout attack.  We started the patient on colchicine.  The patient has improved.  ID will follow-up with the patient as an outpatient to make sure this is not infectious.    The patient will be discharged with colchicine, he was advised to complete colchicine treatment 1 or 2 days after the pain has resolved.  I also spoke to ID and they would like the patient to be on aspirin twice daily for DVT prophylaxis for 30 days.    The patient did have elevated white blood cell count, however it it seems that it is trending down.  I have placed outpatient repeat CBC in a week.    The patient will require close follow-up with PCP, ID and orthopedic surgery as an outpatient.  The patient will most likely require allopurinol once the gout attack has resolved.  Will defer to PCP for further evaluation and treatment if needed.    Therefore, " he is discharged in fair and stable condition to home with close outpatient follow-up.    The patient met 2-midnight criteria for an inpatient stay at the time of discharge.    Discharge Date  09/27/2024    FOLLOW UP ITEMS POST DISCHARGE  Patient will require close follow-up with PCP, ID and orthopedic surgery as an outpatient.  As per orthopedic surgery okay to remove dressing and to shower on postop day 4 which would be tomorrow.  Suture removal in 14 days with orthopedic surgery.    DISCHARGE DIAGNOSES  Principal Problem:    Septic joint (HCC) (POA: Yes)  Active Problems:    Acute idiopathic gout of right knee (POA: Unknown)    Elevated glucose (POA: Unknown)    Elevated blood pressure reading (POA: Unknown)    Anemia (POA: Unknown)    Leukocytosis (POA: Unknown)    Hypomagnesemia (POA: Unknown)  Resolved Problems:    * No resolved hospital problems. *      FOLLOW UP  Future Appointments   Date Time Provider Department Center   10/8/2024  9:30 AM ASHLEY Villagran [facility] [address] [place] [address]     Primary Care Provider    Schedule an appointment as soon as possible for a visit      ASHLEY Villagran  [address] [place] [address] [address]  [facility] [address]  [phone]    Schedule an appointment as soon as possible for a visit      Julito Gomez M.D.  [address] [facility] [address]  [facility] [address] [phone]    Schedule an appointment as soon as possible for a visit        MEDICATIONS ON DISCHARGE     Medication List        START taking these medications        Instructions   aspirin 81 MG Chew chewable tablet  Commonly known as: Asa   Chew 1 Tablet 2 times a day for 30 days.  Dose: 81 mg     colchicine 0.6 MG Tabs  Start taking on: September 28, 2024  Commonly known as: Colcrys   Take 1 Tablet by mouth every day for 7 days.  Dose: 0.6 mg            CONTINUE taking these medications        Instructions   acetaminophen 325 MG Tabs  Commonly known as: Tylenol   Take 650 mg by mouth every four hours as needed.  Dose: 650  mg     albuterol 108 (90 Base) MCG/ACT Aers inhalation aerosol   Inhale 2 Puffs by mouth every 6 hours as needed for Shortness of Breath. With spacer  Dose: 2 Puff     asa/apap/caffeine 250-250-65 MG Tabs  Commonly known as: Excedrin   Take 1 Tablet by mouth every 6 hours as needed for Headache.  Dose: 1 Tablet     guaifenesin-codeine Soln oral solution  Commonly known as: Robitussin AC   Take 10 mL by mouth every 6 hours as needed for Cough.  Dose: 10 mL     ibuprofen 100 MG/5ML Susp  Commonly known as: Motrin   Take 40 mL by mouth every 8 hours as needed.  Dose: 800 mg     loratadine 10 MG Tabs  Commonly known as: Claritin   Take 1 Tab by mouth every day.  Dose: 10 mg              Allergies  No Known Allergies    DIET  Orders Placed This Encounter   Procedures    Diet Order Diet: Regular     Standing Status:   Standing     Number of Occurrences:   1     Order Specific Question:   Diet:     Answer:   Regular [1]       ACTIVITY  As per orthopedic surgery recommendations    CONSULTATIONS  Orthopedic surgery  ID    PROCEDURES  PROCEDURE:  1.  Right knee arthroscopic irrigation debridement      No orders to display        LABORATORY  Lab Results   Component Value Date    SODIUM 139 09/27/2024    POTASSIUM 3.7 09/27/2024    CHLORIDE 103 09/27/2024    CO2 24 09/27/2024    GLUCOSE 85 09/27/2024    BUN 11 09/27/2024    CREATININE 0.85 09/27/2024        Lab Results   Component Value Date    WBC 12.0 (H) 09/27/2024    HEMOGLOBIN 13.4 (L) 09/27/2024    HEMATOCRIT 40.2 (L) 09/27/2024    PLATELETCT 319 09/27/2024        Total time of the discharge process exceeds 31 minutes.

## 2024-09-27 NOTE — CARE PLAN
The patient is Stable - Low risk of patient condition declining or worsening    Shift Goals  Clinical Goals: Manage pain, Monitor for s/s of infection, Free from falls  Patient Goals: Pain control, Rest  Family Goals: n/a    Progress made toward(s) clinical / shift goals:  Patient has been absent of signs and symptoms of infection throughout the shift. He has made no complaints of pain in the knee or feelings of illness. He uses the call light appropriately to ask for assistance.     Patient is not progressing towards the following goals:

## 2024-09-27 NOTE — PROGRESS NOTES
0700 Received report from Night shift nurse  0859 Performed assessment  Pt is A&Ox4, no complaints of pain, Updated on POC: D/C home today  Call light within reach, hourly rounding in place, bed in lowest position.    0952: Called [phone] to schedule PCP, ortho and ID follow up

## 2024-09-27 NOTE — PROGRESS NOTES
Assumed care of pt at 1909, bedside report with PETER Bundy. Pt is AAOx 4, resting comfortably in bed with NADN, pain currently 0/10.    Pt using call light appropriately. Discussed plan of care for the night with patient, bed in lowest position, call light in reach, personal belongings in reach. No complaints at this time.

## 2024-09-30 LAB
BACTERIA SPEC ANAEROBE CULT: NORMAL
BACTERIA SPEC ANAEROBE CULT: NORMAL
SIGNIFICANT IND 70042: NORMAL
SIGNIFICANT IND 70042: NORMAL
SITE SITE: NORMAL
SITE SITE: NORMAL
SOURCE SOURCE: NORMAL
SOURCE SOURCE: NORMAL

## 2024-10-03 ENCOUNTER — OFFICE VISIT (OUTPATIENT)
Dept: MEDICAL GROUP | Facility: LAB | Age: 29
End: 2024-10-03
Payer: COMMERCIAL

## 2024-10-03 VITALS
SYSTOLIC BLOOD PRESSURE: 98 MMHG | HEIGHT: 74 IN | RESPIRATION RATE: 20 BRPM | DIASTOLIC BLOOD PRESSURE: 68 MMHG | TEMPERATURE: 98 F | OXYGEN SATURATION: 96 % | HEART RATE: 75 BPM | BODY MASS INDEX: 37.22 KG/M2 | WEIGHT: 290 LBS

## 2024-10-03 DIAGNOSIS — R74.8 LOW SERUM ASPARTATE AMINOTRANSFERASE (AST): ICD-10-CM

## 2024-10-03 DIAGNOSIS — D64.9 ANEMIA, UNSPECIFIED TYPE: ICD-10-CM

## 2024-10-03 DIAGNOSIS — D72.829 LEUKOCYTOSIS, UNSPECIFIED TYPE: ICD-10-CM

## 2024-10-03 DIAGNOSIS — Z87.39 PERSONAL HISTORY OF INFECTED JOINT: ICD-10-CM

## 2024-10-03 DIAGNOSIS — M25.461 KNEE EFFUSION, RIGHT: ICD-10-CM

## 2024-10-03 DIAGNOSIS — M10.061 ACUTE IDIOPATHIC GOUT OF RIGHT KNEE: ICD-10-CM

## 2024-10-03 PROBLEM — R73.09 ELEVATED GLUCOSE: Status: RESOLVED | Noted: 2024-09-24 | Resolved: 2024-10-03

## 2024-10-03 PROBLEM — E83.42 HYPOMAGNESEMIA: Status: RESOLVED | Noted: 2024-09-26 | Resolved: 2024-10-03

## 2024-10-03 PROCEDURE — 3078F DIAST BP <80 MM HG: CPT | Performed by: STUDENT IN AN ORGANIZED HEALTH CARE EDUCATION/TRAINING PROGRAM

## 2024-10-03 PROCEDURE — 99204 OFFICE O/P NEW MOD 45 MIN: CPT | Performed by: STUDENT IN AN ORGANIZED HEALTH CARE EDUCATION/TRAINING PROGRAM

## 2024-10-03 PROCEDURE — 3074F SYST BP LT 130 MM HG: CPT | Performed by: STUDENT IN AN ORGANIZED HEALTH CARE EDUCATION/TRAINING PROGRAM

## 2024-10-03 RX ORDER — ASPIRIN 81 MG/1
81 TABLET, CHEWABLE ORAL 2 TIMES DAILY
Qty: 60 TABLET | Refills: 0 | Status: SHIPPED | OUTPATIENT
Start: 2024-10-03 | End: 2024-11-02

## 2024-10-03 SDOH — ECONOMIC STABILITY: FOOD INSECURITY: WITHIN THE PAST 12 MONTHS, THE FOOD YOU BOUGHT JUST DIDN'T LAST AND YOU DIDN'T HAVE MONEY TO GET MORE.: NEVER TRUE

## 2024-10-03 SDOH — HEALTH STABILITY: PHYSICAL HEALTH: ON AVERAGE, HOW MANY MINUTES DO YOU ENGAGE IN EXERCISE AT THIS LEVEL?: PATIENT DECLINED

## 2024-10-03 SDOH — ECONOMIC STABILITY: INCOME INSECURITY: IN THE LAST 12 MONTHS, WAS THERE A TIME WHEN YOU WERE NOT ABLE TO PAY THE MORTGAGE OR RENT ON TIME?: NO

## 2024-10-03 SDOH — ECONOMIC STABILITY: INCOME INSECURITY: HOW HARD IS IT FOR YOU TO PAY FOR THE VERY BASICS LIKE FOOD, HOUSING, MEDICAL CARE, AND HEATING?: NOT HARD AT ALL

## 2024-10-03 SDOH — HEALTH STABILITY: PHYSICAL HEALTH: ON AVERAGE, HOW MANY DAYS PER WEEK DO YOU ENGAGE IN MODERATE TO STRENUOUS EXERCISE (LIKE A BRISK WALK)?: 5 DAYS

## 2024-10-03 SDOH — ECONOMIC STABILITY: HOUSING INSECURITY
IN THE LAST 12 MONTHS, WAS THERE A TIME WHEN YOU DID NOT HAVE A STEADY PLACE TO SLEEP OR SLEPT IN A SHELTER (INCLUDING NOW)?: NO

## 2024-10-03 SDOH — ECONOMIC STABILITY: FOOD INSECURITY: WITHIN THE PAST 12 MONTHS, YOU WORRIED THAT YOUR FOOD WOULD RUN OUT BEFORE YOU GOT MONEY TO BUY MORE.: NEVER TRUE

## 2024-10-03 SDOH — ECONOMIC STABILITY: TRANSPORTATION INSECURITY
IN THE PAST 12 MONTHS, HAS LACK OF TRANSPORTATION KEPT YOU FROM MEETINGS, WORK, OR FROM GETTING THINGS NEEDED FOR DAILY LIVING?: NO

## 2024-10-03 SDOH — HEALTH STABILITY: MENTAL HEALTH
STRESS IS WHEN SOMEONE FEELS TENSE, NERVOUS, ANXIOUS, OR CAN'T SLEEP AT NIGHT BECAUSE THEIR MIND IS TROUBLED. HOW STRESSED ARE YOU?: NOT AT ALL

## 2024-10-03 ASSESSMENT — ENCOUNTER SYMPTOMS
WEIGHT LOSS: 0
ABDOMINAL PAIN: 0
NERVOUS/ANXIOUS: 0
HEADACHES: 0
CHILLS: 0
FEVER: 0
DIARRHEA: 0
FOCAL WEAKNESS: 0
DIZZINESS: 0
SPUTUM PRODUCTION: 0
VOMITING: 0
VOMITING: 0
SHORTNESS OF BREATH: 0
ABDOMINAL PAIN: 0
WEIGHT LOSS: 0
PALPITATIONS: 0
CHILLS: 0
BLURRED VISION: 0
COUGH: 0
COUGH: 0
NAUSEA: 0
MYALGIAS: 0
SHORTNESS OF BREATH: 0
WHEEZING: 0
NAUSEA: 0
DOUBLE VISION: 0
FEVER: 0
BRUISES/BLEEDS EASILY: 0
BLOOD IN STOOL: 0
HEADACHES: 0
BRUISES/BLEEDS EASILY: 0

## 2024-10-03 ASSESSMENT — SOCIAL DETERMINANTS OF HEALTH (SDOH)
HOW OFTEN DO YOU ATTENT MEETINGS OF THE CLUB OR ORGANIZATION YOU BELONG TO?: NEVER
DO YOU BELONG TO ANY CLUBS OR ORGANIZATIONS SUCH AS CHURCH GROUPS UNIONS, FRATERNAL OR ATHLETIC GROUPS, OR SCHOOL GROUPS?: NO
IN A TYPICAL WEEK, HOW MANY TIMES DO YOU TALK ON THE PHONE WITH FAMILY, FRIENDS, OR NEIGHBORS?: MORE THAN THREE TIMES A WEEK
HOW OFTEN DO YOU HAVE A DRINK CONTAINING ALCOHOL: NEVER
HOW OFTEN DO YOU ATTEND CHURCH OR RELIGIOUS SERVICES?: NEVER
WITHIN THE PAST 12 MONTHS, YOU WORRIED THAT YOUR FOOD WOULD RUN OUT BEFORE YOU GOT THE MONEY TO BUY MORE: NEVER TRUE
HOW MANY DRINKS CONTAINING ALCOHOL DO YOU HAVE ON A TYPICAL DAY WHEN YOU ARE DRINKING: PATIENT DOES NOT DRINK
HOW OFTEN DO YOU ATTENT MEETINGS OF THE CLUB OR ORGANIZATION YOU BELONG TO?: NEVER
HOW OFTEN DO YOU GET TOGETHER WITH FRIENDS OR RELATIVES?: ONCE A WEEK
IN A TYPICAL WEEK, HOW MANY TIMES DO YOU TALK ON THE PHONE WITH FAMILY, FRIENDS, OR NEIGHBORS?: MORE THAN THREE TIMES A WEEK
HOW HARD IS IT FOR YOU TO PAY FOR THE VERY BASICS LIKE FOOD, HOUSING, MEDICAL CARE, AND HEATING?: NOT HARD AT ALL
HOW OFTEN DO YOU ATTEND CHURCH OR RELIGIOUS SERVICES?: NEVER
HOW OFTEN DO YOU HAVE SIX OR MORE DRINKS ON ONE OCCASION: NEVER
DO YOU BELONG TO ANY CLUBS OR ORGANIZATIONS SUCH AS CHURCH GROUPS UNIONS, FRATERNAL OR ATHLETIC GROUPS, OR SCHOOL GROUPS?: NO
IN THE PAST 12 MONTHS, HAS THE ELECTRIC, GAS, OIL, OR WATER COMPANY THREATENED TO SHUT OFF SERVICE IN YOUR HOME?: NO
HOW OFTEN DO YOU GET TOGETHER WITH FRIENDS OR RELATIVES?: ONCE A WEEK

## 2024-10-03 ASSESSMENT — LIFESTYLE VARIABLES
AUDIT-C TOTAL SCORE: 0
SKIP TO QUESTIONS 9-10: 1
HOW MANY STANDARD DRINKS CONTAINING ALCOHOL DO YOU HAVE ON A TYPICAL DAY: PATIENT DOES NOT DRINK
HOW OFTEN DO YOU HAVE A DRINK CONTAINING ALCOHOL: NEVER
HOW OFTEN DO YOU HAVE SIX OR MORE DRINKS ON ONE OCCASION: NEVER

## 2024-10-03 ASSESSMENT — FIBROSIS 4 INDEX: FIB4 SCORE: 0.19

## 2024-10-08 ENCOUNTER — OFFICE VISIT (OUTPATIENT)
Dept: INFECTIOUS DISEASES | Facility: MEDICAL CENTER | Age: 29
End: 2024-10-08
Attending: NURSE PRACTITIONER
Payer: COMMERCIAL

## 2024-10-08 VITALS
WEIGHT: 289.4 LBS | HEART RATE: 73 BPM | BODY MASS INDEX: 39.2 KG/M2 | SYSTOLIC BLOOD PRESSURE: 102 MMHG | TEMPERATURE: 98.1 F | DIASTOLIC BLOOD PRESSURE: 62 MMHG | HEIGHT: 72 IN | RESPIRATION RATE: 20 BRPM | OXYGEN SATURATION: 96 %

## 2024-10-08 DIAGNOSIS — M10.061 ACUTE IDIOPATHIC GOUT OF RIGHT KNEE: ICD-10-CM

## 2024-10-08 LAB — MISCELLANEOUS LAB RESULT MISCLAB: NORMAL

## 2024-10-08 PROCEDURE — 3074F SYST BP LT 130 MM HG: CPT | Performed by: NURSE PRACTITIONER

## 2024-10-08 PROCEDURE — 3078F DIAST BP <80 MM HG: CPT | Performed by: NURSE PRACTITIONER

## 2024-10-08 PROCEDURE — 99211 OFF/OP EST MAY X REQ PHY/QHP: CPT | Performed by: NURSE PRACTITIONER

## 2024-10-08 PROCEDURE — 99213 OFFICE O/P EST LOW 20 MIN: CPT | Performed by: NURSE PRACTITIONER

## 2024-10-08 ASSESSMENT — ENCOUNTER SYMPTOMS
DIARRHEA: 0
CONSTIPATION: 0

## 2024-10-08 ASSESSMENT — FIBROSIS 4 INDEX: FIB4 SCORE: 0.19

## 2024-10-09 LAB
MISCELLANEOUS LAB RESULT MISCLAB: NORMAL
MISCELLANEOUS LAB RESULT MISCLAB: NORMAL

## 2024-10-22 LAB
FUNGUS SPEC CULT: NORMAL
FUNGUS SPEC CULT: NORMAL
FUNGUS SPEC FUNGUS STN: NORMAL
FUNGUS SPEC FUNGUS STN: NORMAL
SIGNIFICANT IND 70042: NORMAL
SIGNIFICANT IND 70042: NORMAL
SITE SITE: NORMAL
SITE SITE: NORMAL
SOURCE SOURCE: NORMAL
SOURCE SOURCE: NORMAL

## 2024-11-07 LAB
MYCOBACTERIUM SPEC CULT: NORMAL
RHODAMINE-AURAMINE STN SPEC: NORMAL
SIGNIFICANT IND 70042: NORMAL
SITE SITE: NORMAL
SOURCE SOURCE: NORMAL

## 2025-04-03 ENCOUNTER — OFFICE VISIT (OUTPATIENT)
Dept: MEDICAL GROUP | Facility: LAB | Age: 30
End: 2025-04-03
Payer: COMMERCIAL

## 2025-04-03 ENCOUNTER — HOSPITAL ENCOUNTER (OUTPATIENT)
Dept: LAB | Facility: MEDICAL CENTER | Age: 30
End: 2025-04-03
Attending: STUDENT IN AN ORGANIZED HEALTH CARE EDUCATION/TRAINING PROGRAM
Payer: COMMERCIAL

## 2025-04-03 VITALS
WEIGHT: 284.3 LBS | DIASTOLIC BLOOD PRESSURE: 58 MMHG | TEMPERATURE: 97.6 F | BODY MASS INDEX: 36.49 KG/M2 | HEART RATE: 90 BPM | OXYGEN SATURATION: 96 % | HEIGHT: 74 IN | SYSTOLIC BLOOD PRESSURE: 128 MMHG | RESPIRATION RATE: 16 BRPM

## 2025-04-03 DIAGNOSIS — G89.29 CHRONIC PAIN OF RIGHT KNEE: ICD-10-CM

## 2025-04-03 DIAGNOSIS — Z87.39 HISTORY OF GOUT: ICD-10-CM

## 2025-04-03 DIAGNOSIS — Z00.01 ENCOUNTER FOR ANNUAL GENERAL MEDICAL EXAMINATION WITH ABNORMAL FINDINGS IN ADULT: ICD-10-CM

## 2025-04-03 DIAGNOSIS — K05.10 GINGIVITIS: ICD-10-CM

## 2025-04-03 DIAGNOSIS — D64.9 ANEMIA, UNSPECIFIED TYPE: ICD-10-CM

## 2025-04-03 DIAGNOSIS — M25.561 CHRONIC PAIN OF RIGHT KNEE: ICD-10-CM

## 2025-04-03 DIAGNOSIS — D72.829 LEUKOCYTOSIS, UNSPECIFIED TYPE: ICD-10-CM

## 2025-04-03 DIAGNOSIS — R74.8 LOW SERUM ASPARTATE AMINOTRANSFERASE (AST): ICD-10-CM

## 2025-04-03 DIAGNOSIS — Z87.39 PERSONAL HISTORY OF INFECTED JOINT: ICD-10-CM

## 2025-04-03 PROBLEM — E66.812 CLASS 2 OBESITY WITHOUT SERIOUS COMORBIDITY WITH BODY MASS INDEX (BMI) OF 36.0 TO 36.9 IN ADULT: Status: ACTIVE | Noted: 2025-04-03

## 2025-04-03 PROBLEM — M10.061 ACUTE IDIOPATHIC GOUT OF RIGHT KNEE: Status: RESOLVED | Noted: 2024-09-26 | Resolved: 2025-04-03

## 2025-04-03 PROBLEM — L91.8 MULTIPLE ACQUIRED SKIN TAGS: Status: ACTIVE | Noted: 2025-04-03

## 2025-04-03 LAB
BASOPHILS # BLD AUTO: 0.3 % (ref 0–1.8)
BASOPHILS # BLD: 0.04 K/UL (ref 0–0.12)
EOSINOPHIL # BLD AUTO: 0.19 K/UL (ref 0–0.51)
EOSINOPHIL NFR BLD: 1.6 % (ref 0–6.9)
ERYTHROCYTE [DISTWIDTH] IN BLOOD BY AUTOMATED COUNT: 41.2 FL (ref 35.9–50)
FOLATE SERPL-MCNC: 10 NG/ML
HCT VFR BLD AUTO: 43.7 % (ref 42–52)
HGB BLD-MCNC: 13.4 G/DL (ref 14–18)
IMM GRANULOCYTES # BLD AUTO: 0.08 K/UL (ref 0–0.11)
IMM GRANULOCYTES NFR BLD AUTO: 0.7 % (ref 0–0.9)
LYMPHOCYTES # BLD AUTO: 2.21 K/UL (ref 1–4.8)
LYMPHOCYTES NFR BLD: 18.8 % (ref 22–41)
MCH RBC QN AUTO: 27.7 PG (ref 27–33)
MCHC RBC AUTO-ENTMCNC: 30.7 G/DL (ref 32.3–36.5)
MCV RBC AUTO: 90.5 FL (ref 81.4–97.8)
MONOCYTES # BLD AUTO: 0.74 K/UL (ref 0–0.85)
MONOCYTES NFR BLD AUTO: 6.3 % (ref 0–13.4)
NEUTROPHILS # BLD AUTO: 8.51 K/UL (ref 1.82–7.42)
NEUTROPHILS NFR BLD: 72.3 % (ref 44–72)
NRBC # BLD AUTO: 0 K/UL
NRBC BLD-RTO: 0 /100 WBC (ref 0–0.2)
PLATELET # BLD AUTO: 429 K/UL (ref 164–446)
PMV BLD AUTO: 9.6 FL (ref 9–12.9)
RBC # BLD AUTO: 4.83 M/UL (ref 4.7–6.1)
WBC # BLD AUTO: 11.8 K/UL (ref 4.8–10.8)

## 2025-04-03 PROCEDURE — 82746 ASSAY OF FOLIC ACID SERUM: CPT

## 2025-04-03 PROCEDURE — 85025 COMPLETE CBC W/AUTO DIFF WBC: CPT

## 2025-04-03 PROCEDURE — 84550 ASSAY OF BLOOD/URIC ACID: CPT

## 2025-04-03 PROCEDURE — 3074F SYST BP LT 130 MM HG: CPT | Performed by: STUDENT IN AN ORGANIZED HEALTH CARE EDUCATION/TRAINING PROGRAM

## 2025-04-03 PROCEDURE — 82607 VITAMIN B-12: CPT

## 2025-04-03 PROCEDURE — 99213 OFFICE O/P EST LOW 20 MIN: CPT | Mod: 25 | Performed by: STUDENT IN AN ORGANIZED HEALTH CARE EDUCATION/TRAINING PROGRAM

## 2025-04-03 PROCEDURE — 3078F DIAST BP <80 MM HG: CPT | Performed by: STUDENT IN AN ORGANIZED HEALTH CARE EDUCATION/TRAINING PROGRAM

## 2025-04-03 PROCEDURE — 83540 ASSAY OF IRON: CPT

## 2025-04-03 PROCEDURE — 82728 ASSAY OF FERRITIN: CPT

## 2025-04-03 PROCEDURE — 83550 IRON BINDING TEST: CPT

## 2025-04-03 PROCEDURE — 36415 COLL VENOUS BLD VENIPUNCTURE: CPT

## 2025-04-03 PROCEDURE — 84207 ASSAY OF VITAMIN B-6: CPT

## 2025-04-03 PROCEDURE — 99395 PREV VISIT EST AGE 18-39: CPT | Performed by: STUDENT IN AN ORGANIZED HEALTH CARE EDUCATION/TRAINING PROGRAM

## 2025-04-03 ASSESSMENT — ENCOUNTER SYMPTOMS
SHORTNESS OF BREATH: 0
NAUSEA: 0
COUGH: 0
DIARRHEA: 0
ABDOMINAL PAIN: 0
FEVER: 0
VOMITING: 0
WEIGHT LOSS: 0
CHILLS: 0

## 2025-04-03 ASSESSMENT — FIBROSIS 4 INDEX: FIB4 SCORE: 0.2

## 2025-04-03 ASSESSMENT — PATIENT HEALTH QUESTIONNAIRE - PHQ9: CLINICAL INTERPRETATION OF PHQ2 SCORE: 0

## 2025-04-03 NOTE — PROGRESS NOTES
Subjective:     CC:   Chief Complaint   Patient presents with    Annual Exam    Knee Pain     Right     HPI:   Olga Peace is a 30 y.o. male who presents for an annual exam.     Verbal consent was acquired by the patient to use [employer] ambient listening note generation during this visit: YES    History of Present Illness  The patient is a 30-year-old male who presents for his annual exam and right knee pain.    He reports intermittent right knee pain laterally, which he describes as different from the previous infection. The pain intensity varies and is most noticeable upon waking, persisting for a few minutes. He also experiences occasional locking of the upper calf and difficulty in fully extending his knee at times. Mild swelling is present at times if at all. The onset of these symptoms coincides with the time of the initial infection. Uncertain if gout could be a contributing factor, but pain is not severe. He reports no systemic symptoms such as fever or redness in the knee. He maintains good hydration and has made dietary modifications. He manages pain episodes with Tylenol, which provides relief.    His overall diet is satisfactory, although he acknowledges room for improvement. He reports no gastrointestinal issues or urinary symptoms. He does not have asthma and is not currently using albuterol. He reports no hearing or vision problems and sleeps well. He occasionally uses sunscreen and has small skin tags that do not cause discomfort. He has not seen a dentist since he was 18 years old and maintains fair oral hygiene through brushing and flossing. 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    Health Maintenance  AAA Screening: Not indicated  Diet/exercise: Reviewed.  Cholesterol Screening: not indicated  Diabetes Screenin  Aspirin Use: Not indicated     Cancer screening  Colorectal Cancer Screening: Due  at 47.   Lung Cancer Screening: Not indicated  Prostate Cancer Screening/PSA: Not indicated    Infectious disease screening/Immunizations  --Immunizations: Reviewed with patient.   --Hepatitis C Screening: Complete/negative prior  --HIV Screening: Complete/negative prior. Risk factors: no    He  has a past medical history of Acute idiopathic gout of right knee (09/26/2024), Elevated glucose (09/24/2024), and Hypomagnesemia (09/26/2024).    He has no past medical history of Diabetes (HCC) or Hypertension.  He  has a past surgical history that includes irrigation & debridement ortho (Right, 09/24/2024) and orif, knee (Sept. 2024).  Family History   Problem Relation Age of Onset    Gout Father     Other Father         ICH    No Known Problems Sister     Prostate cancer Paternal Grandfather     Diabetes Paternal Grandfather     No Known Problems Son     Colon Cancer Neg Hx      Social History     Tobacco Use    Smoking status: Never    Smokeless tobacco: Never   Vaping Use    Vaping status: Never Used   Substance Use Topics    Alcohol use: No    Drug use: No       Patient Active Problem List    Diagnosis Date Noted    Chronic pain of right knee 04/03/2025    Multiple acquired skin tags 04/03/2025    Gingivitis 04/03/2025    History of gout 04/03/2025    Class 2 obesity without serious comorbidity with body mass index (BMI) of 36.0 to 36.9 in adult 04/03/2025    Low serum aspartate aminotransferase (AST) 10/03/2024    Anemia 09/25/2024    Leukocytosis 09/25/2024    Personal history of infected joint 09/24/2024       Current Outpatient Medications   Medication Sig Dispense Refill    acetaminophen (TYLENOL) 325 MG Tab Take 650 mg by mouth every four hours as needed.       No current facility-administered medications for this visit.    (including changes today)  Allergies: Patient has no known allergies.    Review of Systems   Constitutional:  Negative for chills, fever, malaise/fatigue and weight loss.   HENT:  Negative  "for hearing loss.    Respiratory:  Negative for cough and shortness of breath.    Cardiovascular:  Negative for chest pain.   Gastrointestinal:  Negative for abdominal pain, diarrhea, nausea and vomiting.   Musculoskeletal:  Positive for joint pain.   Skin:  Negative for rash.       Objective:     /58 (BP Location: Right arm, Patient Position: Sitting, BP Cuff Size: Adult long)   Pulse 90   Temp 36.4 °C (97.6 °F) (Temporal)   Resp 16   Ht 1.88 m (6' 2\")   Wt (!) 129 kg (284 lb 4.8 oz)   SpO2 96%   BMI 36.50 kg/m²   Body mass index is 36.5 kg/m².  Wt Readings from Last 4 Encounters:   04/03/25 (!) 129 kg (284 lb 4.8 oz)   02/12/25 (!) 128 kg (283 lb)   10/08/24 (!) 131 kg (289 lb 6.4 oz)   10/03/24 (!) 132 kg (290 lb)       Physical Exam  Vitals reviewed.   Constitutional:       General: He is not in acute distress.     Appearance: Normal appearance. He is obese. He is not ill-appearing.   HENT:      Head: Normocephalic and atraumatic.      Right Ear: Tympanic membrane, ear canal and external ear normal.      Left Ear: Tympanic membrane, ear canal and external ear normal.      Ears:      Comments: Left tympanic membrane partially obstructed by cerumen.     Nose: Nose normal.      Mouth/Throat:      Mouth: Mucous membranes are moist.      Comments: Poor dentition with gingivitis  Eyes:      Conjunctiva/sclera: Conjunctivae normal.   Cardiovascular:      Rate and Rhythm: Normal rate and regular rhythm.      Heart sounds: Normal heart sounds. No murmur heard.  Pulmonary:      Effort: Pulmonary effort is normal. No respiratory distress.      Breath sounds: Normal breath sounds. No wheezing, rhonchi or rales.   Abdominal:      General: Abdomen is flat. Bowel sounds are normal. There is no distension.      Palpations: Abdomen is soft. There is no mass.      Tenderness: There is no abdominal tenderness. There is no guarding or rebound.   Musculoskeletal:      Cervical back: Normal range of motion and neck " supple. No rigidity or tenderness.      Right knee: No swelling, deformity, bony tenderness or crepitus. Normal range of motion. No tenderness.      Instability Tests: Medial Luiz test negative and lateral Luiz test negative.      Right lower leg: No edema.      Left lower leg: No edema.      Comments: Slight decreased range of motion of hip flexion with knee extended compared to left.   Lymphadenopathy:      Cervical: No cervical adenopathy.   Skin:     General: Skin is warm and dry.      Comments: Multiple skin toned fleshy papules about the neck, some with warty appearance.   Neurological:      General: No focal deficit present.      Mental Status: He is alert and oriented to person, place, and time.      Gait: Gait normal.   Psychiatric:         Mood and Affect: Mood normal.         Behavior: Behavior normal.         Thought Content: Thought content normal.        Assessment and Plan:     1. Encounter for annual general medical examination with abnormal findings in adult  HCM: Reviewed with patient as above.  Immunizations discussed/recommended and patient directed to the pharmacy if vaccines not available in clinic.  Age-appropriate anticipatory guidance discussed: Hearing protection, sun screen, dental visits, healthy diet/exercise.    2. Gingivitis  Chronic, encourage oral hygiene and dental appointment.    3. Chronic pain of right knee  4. Personal history of infected joint  5. History of gout  Chronic intermittent nonsevere right lateral knee pain since history of gout/septic arthritis in September last year.  Exam is benign, doubt/septic arthritis not suspected at this time.  Has existing lab work to repeat CBC to ensure resolution of anemia noted last year.  Check uric acid and obtain x-ray.  Provided with home physical therapy for knee conditioning, consider formal physical therapy if needed or referral if indicated.  Continue acetaminophen as needed for pain.  Aware of return precautions.  -  DX-KNEE COMPLETE 4+ RIGHT; Future  - URIC ACID; Future    Follow-up: Return in 1 year (on 4/3/2026), or if symptoms worsen or fail to improve, for Annual.

## 2025-04-03 NOTE — PATIENT INSTRUCTIONS
Vaccines due that can be received only at pharmacy:  COVID    Aim for goal of 150 minutes a week of moderate intensity exercise (ex 30 minutes 5 times a week)

## 2025-04-04 LAB
FERRITIN SERPL-MCNC: 311 NG/ML (ref 22–322)
IRON SATN MFR SERPL: 15 % (ref 15–55)
IRON SERPL-MCNC: 40 UG/DL (ref 50–180)
TIBC SERPL-MCNC: 261 UG/DL (ref 250–450)
UIBC SERPL-MCNC: 221 UG/DL (ref 110–370)
URATE SERPL-MCNC: 10.1 MG/DL (ref 2.5–8.3)
VIT B12 SERPL-MCNC: 483 PG/ML (ref 211–911)

## 2025-04-07 LAB — VIT B6 SERPL-MCNC: 47.8 NMOL/L (ref 20–125)

## 2025-04-20 ENCOUNTER — RESULTS FOLLOW-UP (OUTPATIENT)
Dept: MEDICAL GROUP | Facility: LAB | Age: 30
End: 2025-04-20

## 2025-04-20 DIAGNOSIS — D64.9 ANEMIA, UNSPECIFIED TYPE: ICD-10-CM

## 2025-04-20 DIAGNOSIS — D72.829 LEUKOCYTOSIS, UNSPECIFIED TYPE: ICD-10-CM
